# Patient Record
Sex: MALE | Race: ASIAN | NOT HISPANIC OR LATINO | Employment: FULL TIME | URBAN - METROPOLITAN AREA
[De-identification: names, ages, dates, MRNs, and addresses within clinical notes are randomized per-mention and may not be internally consistent; named-entity substitution may affect disease eponyms.]

---

## 2017-03-06 ENCOUNTER — OFFICE VISIT (OUTPATIENT)
Dept: URGENT CARE | Facility: CLINIC | Age: 22
End: 2017-03-06
Payer: COMMERCIAL

## 2017-03-06 ENCOUNTER — APPOINTMENT (OUTPATIENT)
Dept: RADIOLOGY | Facility: IMAGING CENTER | Age: 22
End: 2017-03-06
Attending: NURSE PRACTITIONER
Payer: COMMERCIAL

## 2017-03-06 VITALS
DIASTOLIC BLOOD PRESSURE: 100 MMHG | WEIGHT: 218 LBS | BODY MASS INDEX: 27.98 KG/M2 | HEART RATE: 83 BPM | RESPIRATION RATE: 18 BRPM | SYSTOLIC BLOOD PRESSURE: 150 MMHG | TEMPERATURE: 98.6 F | OXYGEN SATURATION: 95 % | HEIGHT: 74 IN

## 2017-03-06 DIAGNOSIS — S99.922A INJURY OF LEFT FOOT, INITIAL ENCOUNTER: ICD-10-CM

## 2017-03-06 DIAGNOSIS — S96.912A STRAIN OF LEFT ANKLE, INITIAL ENCOUNTER: ICD-10-CM

## 2017-03-06 PROCEDURE — 99214 OFFICE O/P EST MOD 30 MIN: CPT | Performed by: NURSE PRACTITIONER

## 2017-03-06 PROCEDURE — 73610 X-RAY EXAM OF ANKLE: CPT | Mod: TC,LT | Performed by: NURSE PRACTITIONER

## 2017-03-06 ASSESSMENT — ENCOUNTER SYMPTOMS
BRUISES/BLEEDS EASILY: 0
WEAKNESS: 0
FEVER: 0
CHILLS: 0
FALLS: 1
MYALGIAS: 1
SENSORY CHANGE: 0
TINGLING: 0

## 2017-03-06 NOTE — Clinical Note
March 6, 2017       Patient: Leno Jo   YOB: 1995   Date of Visit: 3/6/2017         To Whom It May Concern:    It is my medical opinion that Leno Jo be excused from work due to injury of left foot. May return after 3/10/17.    If you have any questions or concerns, please don't hesitate to call [phone]          Sincerely,          TIRSO [email or web address].  Electronically Signed

## 2017-03-06 NOTE — MR AVS SNAPSHOT
"        Leno Jo   3/6/2017 5:15 PM   Office Visit   MRN: [id number]    Department:  [facility]   Dept Phone:  [phone]    Description:  Male : 1995   Provider:  SHANNAN Youssef           Reason for Visit     Ankle Injury x today was playing basketball and stepped on someone's foot and rolled his L ankle, having pain and swelling       Allergies as of 3/6/2017     Not on File      You were diagnosed with     Injury of left foot, initial encounter   [264177]       Strain of left ankle, initial encounter   [[id number]]         Vital Signs     Blood Pressure Pulse Temperature Respirations Height Weight    150/100 mmHg 83 37 °C (98.6 °F) 18 1.88 m (6' 2\") 98.884 kg (218 lb)    Body Mass Index Oxygen Saturation                27.98 kg/m2 95%          Basic Information     Date Of Birth Sex Race Ethnicity Preferred Language    1995 Male  Non- English      Health Maintenance        Date Due Completion Dates    IMM HEP B VACCINE (1 of 3 - Primary Series) 1995 ---    IMM HEP A VACCINE (1 of 2 - Standard Series) 3/31/1994 ---    IMM HPV VACCINE (1 of 3 - Male 3 Dose Series) 3/31/2004 ---    IMM VARICELLA (CHICKENPOX) VACCINE (1 of 2 - 2 Dose Adolescent Series) 3/31/2006 ---    IMM DTaP/Tdap/Td Vaccine (1 - Tdap) 3/31/2012 ---    IMM INFLUENZA (1) 2016 ---            Current Immunizations     Tuberculin Skin Test 2012 11:45 AM      Below and/or attached are the medications your provider expects you to take. Review all of your home medications and newly ordered medications with your provider and/or pharmacist. Follow medication instructions as directed by your provider and/or pharmacist. Please keep your medication list with you and share with your provider. Update the information when medications are discontinued, doses are changed, or new medications (including over-the-counter products) are added; and carry medication information at all times in the event of " emergency situations     Allergies:  No Known Allergies          Medications  Valid as of: March 06, 2017 -  6:14 PM    Generic Name Brand Name Tablet Size Instructions for use    Ibuprofen (Suspension) MOTRIN 100 MG/5ML Take 10 mg/kg by mouth every 6 hours as needed.        Prochlorperazine Maleate (Tab) COMPAZINE 5 MG Take 1 Tab by mouth every 6 hours as needed for Nausea/Vomiting (or dizziness).        .                 Medicines prescribed today were sent to:     [facility] [address] [place] [address]), [place] - [address]    [address] () [place] [address]    Phone: [phone] Fax: [phone]    Open 24 Hours?: No      Medication refill instructions:       If your prescription bottle indicates you have medication refills left, it is not necessary to call your provider’s office. Please contact your pharmacy and they will refill your medication.    If your prescription bottle indicates you do not have any refills left, you may request refills at any time through one of the following ways: The online BlastRoots system (except Urgent Care), by calling your provider’s office, or by asking your pharmacy to contact your provider’s office with a refill request. Medication refills are processed only during regular business hours and may not be available until the next business day. Your provider may request additional information or to have a follow-up visit with you prior to refilling your medication.   *Please Note: Medication refills are assigned a new Rx number when refilled electronically. Your pharmacy may indicate that no refills were authorized even though a new prescription for the same medication is available at the pharmacy. Please request the medicine by name with the pharmacy before contacting your provider for a refill.        Your To Do List     Future Labs/Procedures Complete By Expires    DX-ANKLE 3+ VIEWS LEFT  As directed 9/6/2017         BlastRoots Access Code: [email or web address]  Expires:  4/5/2017  6:14 PM    MyPrepAppt  A secure, online tool to manage your health information     [employer] Webflow® is a secure, online tool that connects you to your personalized health information from the privacy of your home -- day or night - making it very easy for you to manage your healthcare. Once the activation process is completed, you can even access your medical information using the Webflow jasbir, which is available for free in the Apple Jasbir store or Google Play store.     Webflow provides the following levels of access (as shown below):   My Chart Features   [facility] Primary [facility] Doctor [facility]  Specialists [facility]  Urgent  Care Non-[facility]  Primary Care  Doctor   Email your healthcare team securely and privately 24/7 X X X    Manage appointments: schedule your next appointment; view details of past/upcoming appointments X      Request prescription refills. X      View recent personal medical records, including lab and immunizations X X X X   View health record, including health history, allergies, medications X X X X   Read reports about your outpatient visits, procedures, consult and ER notes X X X X   See your discharge summary, which is a recap of your hospital and/or ER visit that includes your diagnosis, lab results, and care plan. X X       How to register for Webflow:  1. Go to  [email or web address].  2. Click on the Sign Up Now box, which takes you to the New Member Sign Up page. You will need to provide the following information:  a. Enter your Webflow Access Code exactly as it appears at the top of this page. (You will not need to use this code after you’ve completed the sign-up process. If you do not sign up before the expiration date, you must request a new code.)   b. Enter your date of birth.   c. Enter your home email address.   d. Click Submit, and follow the next screen’s instructions.  3. Create a Webflow ID. This will be your Webflow login ID and cannot be changed, so think of one  that is secure and easy to remember.  4. Create a [employer] password. You can change your password at any time.  5. Enter your Password Reset Question and Answer. This can be used at a later time if you forget your password.   6. Enter your e-mail address. This allows you to receive e-mail notifications when new information is available in [employer].  7. Click Sign Up. You can now view your health information.    For assistance activating your [employer] account, call [phone]

## 2017-03-07 NOTE — PROGRESS NOTES
"Subjective:      Leno Jo is a 21 y.o. male who presents with Ankle Injury            Ankle Injury   Pertinent negatives include no tingling.   Leno is a 21 year old male who is here for left ankle injury today. Was playing basketball about 5 hrs ago and fell on top of another person's foot and twisted his left ankle. Swelling and pain with weight bearing and walking. Applied ice x 1 hr today, no NSAID. No previous ankle injury. No ace wrap or topical analgesic. 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    PMH:  has no past medical history on file.  MEDS:   Current outpatient prescriptions:   •  ibuprofen (MOTRIN) 100 MG/5ML Suspension, Take 10 mg/kg by mouth every 6 hours as needed., Disp: , Rfl:   •  prochlorperazine (COMPAZINE) 5 MG Tab, Take 1 Tab by mouth every 6 hours as needed for Nausea/Vomiting (or dizziness)., Disp: 15 Tab, Rfl: 0  ALLERGIES: Not on File  SURGHX: No past surgical history on file.  SOCHX:    FH: Family history was reviewed, no pertinent findings to report      Review of Systems   Constitutional: Negative for fever, chills and malaise/fatigue.   Musculoskeletal: Positive for myalgias, joint pain and falls.   Neurological: Negative for tingling, sensory change and weakness.   Endo/Heme/Allergies: Does not bruise/bleed easily.          Objective:     /100 mmHg  Pulse 83  Temp(Src) 37 °C (98.6 °F)  Resp 18  Ht 1.88 m (6' 2\")  Wt 98.884 kg (218 lb)  BMI 27.98 kg/m2  SpO2 95%     Physical Exam   Constitutional: He is oriented to person, place, and time. He appears well-developed and well-nourished. No distress.   HENT:   Head: Normocephalic.   Eyes: EOM are normal.   Neck: Neck supple.   Cardiovascular:   Pulses:       Dorsalis pedis pulses are 2+ on the left side.        Posterior tibial pulses are 2+ on the left side.   Pulmonary/Chest: Effort normal.   Musculoskeletal:        Left foot: There is decreased range of motion, tenderness, " bony tenderness and swelling. There is normal capillary refill, no crepitus and no deformity.        Feet:    Swelling 2+ on lateral aspect of left ankle bone, swelling 1+ on medial aspect, TTP at both site, no redness or bruising seen   Feet:   Left Foot:   Protective Sensation: 10 sites tested. 10 sites sensed.  Skin Integrity: Negative for ulcer, blister, skin breakdown, erythema, warmth, callus or dry skin.   Neurological: He is alert and oriented to person, place, and time.   Skin: Skin is warm and dry. He is not diaphoretic. No erythema.   Vitals reviewed.    Left foot xray:    FINDINGS:  The alignment of the ankle is normal.  There is no  soft tissue swelling.  There is no evidence of displaced fracture or dislocation.     MA applied ace wrap and crutches   Assessment/Plan:     1. Injury of left foot, initial encounter    - DX-ANKLE 3+ VIEWS LEFT; Future    2. Strain of left ankle, initial encounter    May use Ibuprofen prn for pain, up to 600 mg every 4-6 hrs  May use cool compresses for swelling prn  May utilize RICE method prn  Avoid excessive weight bearing until swelling and pain improve  May apply topical analgesics prn  Perform proper body mechanics with lifting, twisting, bending and walking. Need assistance with heavy lifting   Monitor for deformity, numbness/tingling in toes, decreased ROM with walking difficulty, increase swelling/pain- need re-evaluation  Work note provided

## 2017-03-09 ENCOUNTER — TELEPHONE (OUTPATIENT)
Dept: URGENT CARE | Facility: CLINIC | Age: 22
End: 2017-03-09

## 2017-03-10 ENCOUNTER — OFFICE VISIT (OUTPATIENT)
Dept: MEDICAL GROUP | Facility: CLINIC | Age: 22
End: 2017-03-10
Payer: COMMERCIAL

## 2017-03-10 VITALS
TEMPERATURE: 98.2 F | HEART RATE: 67 BPM | RESPIRATION RATE: 18 BRPM | WEIGHT: 217 LBS | DIASTOLIC BLOOD PRESSURE: 90 MMHG | OXYGEN SATURATION: 98 % | SYSTOLIC BLOOD PRESSURE: 140 MMHG | BODY MASS INDEX: 27.85 KG/M2 | HEIGHT: 74 IN

## 2017-03-10 DIAGNOSIS — M25.572 ACUTE LEFT ANKLE PAIN: ICD-10-CM

## 2017-03-10 PROCEDURE — 99213 OFFICE O/P EST LOW 20 MIN: CPT | Performed by: PHYSICIAN ASSISTANT

## 2017-03-10 RX ORDER — DICLOFENAC POTASSIUM 50 MG/1
50 TABLET, FILM COATED ORAL 3 TIMES DAILY
Qty: 30 TAB | Refills: 0 | Status: SHIPPED | OUTPATIENT
Start: 2017-03-10 | End: 2018-02-09

## 2017-03-10 NOTE — PROGRESS NOTES
Chief Complaint   Patient presents with   • Ankle Injury     x4 days       HPI  Leno Jo is a 21 y.o. male here today for L ankle pain following injury in basketball. This is a new problem to me. Pain has subsided, currently 5/10. Has tried ibuprofen and ice. States pain has subsided and he can walk without any help, does not use crutches.  Patient can bear weight over left feet and states he is able to lift objects.   pt was seen at  and Xray revealed no acute fractures or dislocations. The patient was off work. He works at [employer] with lifting heavy objects. Patient states he is ready to go back to work    Past medical history is reviewed in Epic chart by me today.   Medications and allergies reviewed and updated in Epic chart by me today.     ROS:   As documented in history of present illness above    Exam:  There were no vitals taken for this visit.  Constitutional: Alert, no distress, plus 3 vital signs  Skin:  Warm, dry, no rashes invisible areas  MS: L ankel and dorsal feet swollen compared to R feet, pulse intact.  mild pain w passive plantar flexion and inversion of the foot, walks without limbing  CV: RRR  Psych: Alert, pleasant, well-groomed, normal affect      A/P:  1. Acute left ankle pain  Discussed Ace bandage, rest, ice, anti-inflammatory  - diclofenac (CATAFLAM) 50 MG tablet; Take 1 Tab by mouth 3 times a day.  Dispense: 30 Tab; Refill: 0  Paperwork was filled for patient to return to work on March 14    F/u prn

## 2017-03-10 NOTE — MR AVS SNAPSHOT
"Leno Jo   3/10/2017 10:25 AM   Office Visit   MRN: [id number]    Department:  [facility]   Dept Phone:  [phone]    Description:  Male : 1995   Provider:  Divya Gonzalez PA-C           Reason for Visit     Ankle Injury x4 days      Allergies as of 3/10/2017     No Known Allergies      You were diagnosed with     Acute left ankle pain   [[id number]]         Vital Signs     Blood Pressure Pulse Temperature Respirations Height Weight    140/90 mmHg 67 36.8 °C (98.2 °F) 18 1.88 m (6' 2.02\") 98.431 kg (217 lb)    Body Mass Index Oxygen Saturation                27.85 kg/m2 98%          Basic Information     Date Of Birth Sex Race Ethnicity Preferred Language    1995 Male  Non- English      Health Maintenance        Date Due Completion Dates    IMM HEP B VACCINE (1 of 3 - Primary Series) 1995 ---    IMM HEP A VACCINE (1 of 2 - Standard Series) 3/31/1994 ---    IMM HPV VACCINE (1 of 3 - Male 3 Dose Series) 3/31/2004 ---    IMM VARICELLA (CHICKENPOX) VACCINE (1 of 2 - 2 Dose Adolescent Series) 3/31/2006 ---    IMM DTaP/Tdap/Td Vaccine (1 - Tdap) 3/31/2012 ---    IMM INFLUENZA (1) 2016 ---            Current Immunizations     Tuberculin Skin Test 2012 11:45 AM      Below and/or attached are the medications your provider expects you to take. Review all of your home medications and newly ordered medications with your provider and/or pharmacist. Follow medication instructions as directed by your provider and/or pharmacist. Please keep your medication list with you and share with your provider. Update the information when medications are discontinued, doses are changed, or new medications (including over-the-counter products) are added; and carry medication information at all times in the event of emergency situations     Allergies:  No Known Allergies          Medications  Valid as of: March 10, 2017 -  1:28 PM    Generic Name Brand Name Tablet Size " Instructions for use    Diclofenac Potassium (Tab) CATAFLAM 50 MG Take 1 Tab by mouth 3 times a day.        Ibuprofen (Suspension) MOTRIN 100 MG/5ML Take 10 mg/kg by mouth every 6 hours as needed.        Prochlorperazine Maleate (Tab) COMPAZINE 5 MG Take 1 Tab by mouth every 6 hours as needed for Nausea/Vomiting (or dizziness).        .                 Medicines prescribed today were sent to:     [address] (), [place] - [address]    [address] () [place] [address]    Phone: [phone] Fax: [phone]    Open 24 Hours?: No      Medication refill instructions:       If your prescription bottle indicates you have medication refills left, it is not necessary to call your provider’s office. Please contact your pharmacy and they will refill your medication.    If your prescription bottle indicates you do not have any refills left, you may request refills at any time through one of the following ways: The online MeMed system (except Urgent Care), by calling your provider’s office, or by asking your pharmacy to contact your provider’s office with a refill request. Medication refills are processed only during regular business hours and may not be available until the next business day. Your provider may request additional information or to have a follow-up visit with you prior to refilling your medication.   *Please Note: Medication refills are assigned a new Rx number when refilled electronically. Your pharmacy may indicate that no refills were authorized even though a new prescription for the same medication is available at the pharmacy. Please request the medicine by name with the pharmacy before contacting your provider for a refill.           MeMed Access Code: [email or web address]  Expires: 4/5/2017  6:14 PM    MeMed  A secure, online tool to manage your health information     [employer]’s MeMed® is a secure, online tool that connects you to your [employer] health  information from the privacy of your home -- day or night - making it very easy for you to manage your healthcare. Once the activation process is completed, you can even access your medical information using the Pley jasbir, which is available for free in the Apple Jasbir store or Google Play store.     Pley provides the following levels of access (as shown below):   My Chart Features   [facility] Primary Care Doctor [facility]  Specialists [facility]  Urgent  Care Non-[facility]  Primary Care  Doctor   Email your healthcare team securely and privately 24/7 X X X    Manage appointments: schedule your next appointment; view details of past/upcoming appointments X      Request prescription refills. X      View recent personal medical records, including lab and immunizations X X X X   View health record, including health history, allergies, medications X X X X   Read reports about your outpatient visits, procedures, consult and ER notes X X X X   See your discharge summary, which is a recap of your hospital and/or ER visit that includes your diagnosis, lab results, and care plan. X X       How to register for Pley:  1. Go to  [email or web address].  2. Click on the Sign Up Now box, which takes you to the New Member Sign Up page. You will need to provide the following information:  a. Enter your Pley Access Code exactly as it appears at the top of this page. (You will not need to use this code after you’ve completed the sign-up process. If you do not sign up before the expiration date, you must request a new code.)   b. Enter your date of birth.   c. Enter your home email address.   d. Click Submit, and follow the next screen’s instructions.  3. Create a Pley ID. This will be your Pley login ID and cannot be changed, so think of one that is secure and easy to remember.  4. Create a Pley password. You can change your password at any time.  5. Enter your Password Reset Question and Answer. This can be used at a later  time if you forget your password.   6. Enter your e-mail address. This allows you to receive e-mail notifications when new information is available in [employer].  7. Click Sign Up. You can now view your health information.    For assistance activating your [employer] account, call [phone]

## 2017-03-10 NOTE — TELEPHONE ENCOUNTER
You saw pt 3/6 and pt is requesting to see you for pts leave of abscence claim due to injury seen for. Pt needs release to go back to work. I tried to advise him towards a primary care dr and he feels as though you need to be the one to fill out paper work.

## 2018-02-09 ENCOUNTER — OFFICE VISIT (OUTPATIENT)
Dept: URGENT CARE | Facility: CLINIC | Age: 23
End: 2018-02-09
Payer: COMMERCIAL

## 2018-02-09 VITALS
SYSTOLIC BLOOD PRESSURE: 156 MMHG | OXYGEN SATURATION: 96 % | RESPIRATION RATE: 18 BRPM | HEIGHT: 74 IN | BODY MASS INDEX: 31.83 KG/M2 | HEART RATE: 80 BPM | TEMPERATURE: 98.2 F | DIASTOLIC BLOOD PRESSURE: 96 MMHG | WEIGHT: 248 LBS

## 2018-02-09 DIAGNOSIS — R03.0 ELEVATED BLOOD PRESSURE READING: ICD-10-CM

## 2018-02-09 PROCEDURE — 99213 OFFICE O/P EST LOW 20 MIN: CPT | Performed by: NURSE PRACTITIONER

## 2018-02-10 NOTE — PROGRESS NOTES
Chief Complaint   Patient presents with   • Blood Pressure Problem     today blood pressure was high at dentist office  167/110       HISTORY OF PRESENT ILLNESS: Patient is a 22 y.o. male who presents to urgent care today with complaints of elevated blood pressure. The patient reports he went to his dentist today in order to schedule a tooth extraction. He was found to have an elevated blood pressure reading of 167/110. He denies a history of known hypertension. He denies associated chest pain, shortness breath, dizziness, syncope, unilateral weakness, swelling, changes in urination, or fatigue. States he otherwise feels well.     There are no active problems to display for this patient.      Allergies:Patient has no known allergies.    No current [facility]-ordered outpatient prescriptions on file.     No current [facility]-ordered facility-administered medications on file.        No past medical history on file.    Social History   Substance Use Topics   • Smoking status: Not on file   • Smokeless tobacco: Not on file   • Alcohol use Not on file       No family status information on file.   No family history on file.    ROS:  Review of Systems   Constitutional: Negative for fever, chills, weight loss, malaise, and fatigue.   HENT: Negative for ear pain, nosebleeds, congestion, sore throat and neck pain.    Eyes: Negative for vision changes.   Neuro: Negative for headache, sensory changes, weakness, seizure, LOC.   Cardiovascular: Negative for chest pain, palpitations, orthopnea and leg swelling.   Respiratory: Negative for cough, sputum production, shortness of breath and wheezing.   Gastrointestinal: Negative for abdominal pain, nausea, vomiting or diarrhea.   Genitourinary: Negative for dysuria, urgency and frequency.  Musculoskeletal: Negative for falls, neck pain, back pain, joint pain, myalgias.   Skin: Negative for rash, diaphoresis.     Exam:  Blood pressure 156/96, pulse 80, temperature 36.8 °C (98.2 °F), resp. rate  "18, height 1.88 m (6' 2\"), weight 112.5 kg (248 lb), SpO2 96 %.  General: well-nourished, well-developed male in NAD  Head: normocephalic, atraumatic  Eyes: PERRLA, no conjunctival injection, acuity grossly intact, lids normal.  Ears: normal shape and symmetry, no tenderness, no discharge. External canals are without any significant edema or erythema. Tympanic membranes are without any inflammation, no effusion. Gross auditory acuity is intact.  Nose: symmetrical without tenderness, no discharge.  Mouth/Throat: reasonable hygiene, no erythema, exudates or tonsillar enlargement.  Neck: no masses, range of motion within normal limits, no tracheal deviation. No obvious thyroid enlargement.   Lymph: no cervical adenopathy. No supraclavicular adenopathy.   Neuro: alert and oriented. Cranial nerves 1-12 grossly intact. No sensory deficit.   Cardiovascular: regular rate and rhythm. No edema.  Pulmonary: no distress. Chest is symmetrical with respiration, no wheezes, crackles, or rhonchi.   Musculoskeletal: no clubbing, appropriate muscle tone, gait is stable.  Skin: warm, dry, intact, no clubbing, no cyanosis, no rashes.   Psych: appropriate mood, affect, judgement.         Assessment/Plan:  1. Elevated blood pressure reading         The patient's blood pressure reading is elevated in office today at 160/98. The patient's blood pressure decreased to 156/96 over the next 20 minutes. He schedule an appointment to establish care and for blood pressure medication management with a PCP next week. The meantime I have encouraged a reduced salt diet and daily exercise. He is instructed to check and log blood pressures regularly.  Supportive care, differential diagnoses, and indications for immediate follow-up discussed with patient.   Pathogenesis of diagnosis discussed including typical length and natural progression.   Instructed to return to clinic or nearest emergency department for any change in condition, further concerns, " or worsening of symptoms.  Patient states understanding of the plan of care and discharge instructions.  Instructed to follow up with his new PCP next week as planned.        Please note that this dictation was created using voice recognition software. I have made every reasonable attempt to correct obvious errors, but I expect that there are errors of grammar and possibly content that I did not discover before finalizing the note.      COLE Ervin.

## 2018-03-02 ENCOUNTER — OFFICE VISIT (OUTPATIENT)
Dept: MEDICAL GROUP | Facility: PHYSICIAN GROUP | Age: 23
End: 2018-03-02
Payer: COMMERCIAL

## 2018-03-02 VITALS
SYSTOLIC BLOOD PRESSURE: 132 MMHG | TEMPERATURE: 97.9 F | OXYGEN SATURATION: 97 % | BODY MASS INDEX: 31.7 KG/M2 | HEART RATE: 67 BPM | HEIGHT: 74 IN | DIASTOLIC BLOOD PRESSURE: 70 MMHG | RESPIRATION RATE: 16 BRPM | WEIGHT: 247 LBS

## 2018-03-02 DIAGNOSIS — I10 ESSENTIAL HYPERTENSION: ICD-10-CM

## 2018-03-02 DIAGNOSIS — E66.9 OBESITY (BMI 30-39.9): ICD-10-CM

## 2018-03-02 DIAGNOSIS — Z83.3 FAMILY HISTORY OF DIABETES MELLITUS IN FATHER: ICD-10-CM

## 2018-03-02 PROCEDURE — 99214 OFFICE O/P EST MOD 30 MIN: CPT | Performed by: NURSE PRACTITIONER

## 2018-03-02 RX ORDER — LISINOPRIL 10 MG/1
10 TABLET ORAL DAILY
Qty: 30 TAB | Refills: 0 | Status: SHIPPED | OUTPATIENT
Start: 2018-03-02 | End: 2019-04-30

## 2018-03-02 ASSESSMENT — PAIN SCALES - GENERAL: PAINLEVEL: NO PAIN

## 2018-03-02 ASSESSMENT — PATIENT HEALTH QUESTIONNAIRE - PHQ9: CLINICAL INTERPRETATION OF PHQ2 SCORE: 0

## 2018-03-03 NOTE — ASSESSMENT & PLAN NOTE
This is a new problem. States that over the last year he has had multiple elevated BP. States it was severely elevated at the dentist and he was seen at urgent care with a 156/96, in 2017 he had a blood pressure 140/90. Drinks energy drinks 3x/week and coffee 4x/week. Eats a lot of candies and fried processed foods. They do cook at home which they eat lots of fish and meats, patient does not like vegetable.

## 2018-03-03 NOTE — PROGRESS NOTES
Chief Complaint   Patient presents with   • Establish Care     New Patient    • Blood Pressure Problem     x 2 weeks 160/90 home cuff    • Other     wants blood work        HISTORY OF PRESENT ILLNESS: Patient is a 22 y.o. male established patient who presents today to discuss blood pressure.    Essential hypertension  This is a new problem. States that over the last year he has had multiple elevated BP. States it was severely elevated at the dentist and he was seen at urgent care with a 156/96, in 2017 he had a blood pressure 140/90. Drinks energy drinks 3x/week and coffee 4x/week. Eats a lot of candies and fried processed foods. They do cook at home which they eat lots of fish and meats, patient does not like vegetable.       Patient Active Problem List    Diagnosis Date Noted   • Essential hypertension 03/02/2018   • Obesity (BMI 30-39.9) 03/02/2018       Allergies:Patient has no known allergies.    Current Outpatient Prescriptions   Medication Sig Dispense Refill   • lisinopril (PRINIVIL) 10 MG Tab Take 1 Tab by mouth every day. 30 Tab 0     No current facility-administered medications for this visit.        Social History   Substance Use Topics   • Smoking status: Never Smoker   • Smokeless tobacco: Never Used   • Alcohol use Yes      Comment: occ        Family Status   Relation Status   • Mother Alive   • Father Alive   • Brother      Family History   Problem Relation Age of Onset   • No Known Problems Mother    • Diabetes Father    • Diabetes Brother        Review of Systems:   Constitutional:  Negative for fever, chills, weight loss and malaise/fatigue.   HEENT:  Negative for ear pain, nosebleeds, congestion, sore throat and neck pain.    Eyes:  Negative for blurred vision.   Respiratory:  Negative for cough, sputum production, shortness of breath and wheezing.    Cardiovascular:  Negative for chest pain, palpitations, orthopnea and leg swelling.   Gastrointestinal:  Negative for heartburn, nausea, vomiting  "and abdominal pain.   Genitourinary:  Negative for dysuria, urgency and frequency.   Musculoskeletal:  Negative for myalgias, back pain and joint pain.   Skin:  Negative for rash and itching.   Neurological:  Negative for dizziness, tingling, tremors, sensory change, focal weakness and headaches.   Endo/Heme/Allergies:  Does not bruise/bleed easily.   Psychiatric/Behavioral:  Negative for depression, suicidal ideas and memory loss.  The patient is not nervous/anxious and does not have insomnia.    All other systems reviewed and are negative except as in HPI.    Exam:  Blood pressure 132/70, pulse 67, temperature 36.6 °C (97.9 °F), resp. rate 16, height 1.88 m (6' 2\"), weight 112 kg (247 lb), SpO2 97 %.  General:  Normal appearing. No distress.  HEENT:  Normocephalic. Eyes conjunctiva clear lids without ptosis, pupils equal and reactive to light accommodation, ears normal shape and contour, canals are clear bilaterally, tympanic membranes are benign, nasal mucosa benign, oropharynx is without erythema, edema or exudates.   Neck:  Supple without JVD or bruit. Thyroid is not enlarged.  Pulmonary:  Clear to ausculation.  Normal effort. No rales, ronchi, or wheezing.  Cardiovascular:  Regular rate and rhythm without murmur. Carotid and radial pulses are intact and equal bilaterally.  Abdomen:  Soft, nontender, nondistended. Normal bowel sounds. Liver and spleen are not palpable  Neurologic:  Grossly nonfocal  Lymph:  No cervical, supraclavicular or axillary lymph nodes are palpable  Skin:  Warm and dry.  No obvious lesions.  Musculoskeletal:  Normal gait. No extremity cyanosis, clubbing, or edema.  Psych:  Normal mood and affect. Alert and oriented x3. Judgment and insight is normal.      PLAN:    1. Essential hypertension  Patient has had multiple elevated blood pressures including a 140/90 in 2017, 156/96 in February, 160 something over 110  Plan to start lisinopril 10 mg daily. Counseled patient guarding risk, " benefits, side effects discussed with patient that if he has side effects of the medication he should contact this provider immediately as we can change his medication prior to his next appointment if necessary.  - LIPID PROFILE; Future  - COMP METABOLIC PANEL; Future  - lisinopril (PRINIVIL) 10 MG Tab; Take 1 Tab by mouth every day.  Dispense: 30 Tab; Refill: 0    2. Family history of diabetes mellitus in father  Plan to check patient's fasting sugar related to obesity, family history of type 2 diabetes in his father which developed at a young age.    3. Obesity (BMI 30-39.9)  Counseled patient extensively regarding healthy diet and exercise, discussed decreasing his sugar intake discussed the importance of eating a variety of foods, discussed the importance of eating vegetables discussed taking daily multivitamin, discussed not drinking energy drinks as they can cause elevated blood pressure.    Follow-up in one month or sooner as needed. Patient is encouraged to be seen in the emergency room for chest pain, palpitations, shortness of breath, dizziness, severe abdominal pain or other concerning symptoms.      Please note that this dictation was created using voice recognition software. I have made every reasonable attempt to correct obvious errors, but I expect that there are errors of grammar and possibly content that I did not discover before finalizing the note.    Assessment/Plan:  1. Essential hypertension  LIPID PROFILE    COMP METABOLIC PANEL    lisinopril (PRINIVIL) 10 MG Tab   2. Family history of diabetes mellitus in father     3. Obesity (BMI 30-39.9)            I have placed the below orders and discussed them with an approved delegating provider. The MA is performing the below orders under the direction of Dr. Manzano.

## 2019-04-30 ENCOUNTER — OFFICE VISIT (OUTPATIENT)
Dept: MEDICAL GROUP | Facility: PHYSICIAN GROUP | Age: 24
End: 2019-04-30
Payer: COMMERCIAL

## 2019-04-30 VITALS
DIASTOLIC BLOOD PRESSURE: 98 MMHG | RESPIRATION RATE: 16 BRPM | BODY MASS INDEX: 33.26 KG/M2 | WEIGHT: 259.2 LBS | HEART RATE: 95 BPM | TEMPERATURE: 98.7 F | HEIGHT: 74 IN | SYSTOLIC BLOOD PRESSURE: 178 MMHG | OXYGEN SATURATION: 97 %

## 2019-04-30 DIAGNOSIS — I10 ESSENTIAL HYPERTENSION: ICD-10-CM

## 2019-04-30 DIAGNOSIS — Z00.00 ROUTINE MEDICAL EXAM: ICD-10-CM

## 2019-04-30 DIAGNOSIS — Z83.3 FAMILY HISTORY OF DIABETES MELLITUS IN FIRST DEGREE RELATIVE: ICD-10-CM

## 2019-04-30 DIAGNOSIS — R53.83 FATIGUE, UNSPECIFIED TYPE: ICD-10-CM

## 2019-04-30 DIAGNOSIS — E66.9 OBESITY (BMI 30-39.9): ICD-10-CM

## 2019-04-30 PROCEDURE — 99395 PREV VISIT EST AGE 18-39: CPT | Performed by: NURSE PRACTITIONER

## 2019-04-30 RX ORDER — LISINOPRIL 20 MG/1
20 TABLET ORAL DAILY
Qty: 30 TAB | Refills: 0 | Status: SHIPPED | OUTPATIENT
Start: 2019-04-30 | End: 2019-06-03 | Stop reason: SDUPTHER

## 2019-04-30 ASSESSMENT — PATIENT HEALTH QUESTIONNAIRE - PHQ9: CLINICAL INTERPRETATION OF PHQ2 SCORE: 0

## 2019-04-30 NOTE — PROGRESS NOTES
Subjective:     CC:   Chief Complaint   Patient presents with   • Labs Only       HPI:   Leno Jo is a 24 y.o. male who presents for an annual exam.     Essential hypertension  Chronic. Patient was put on lisinopril in the past for a period of time but stopped taking it. He denies having any side effects from this medication. States he just forgot to take the medication. His blood pressure in clinic today was 178/98. Patient states he has intermittent shortness of breath that occurs when he exercises. He reports occasional chest pain. Wife states he has had recent weight gain. No reports of palpitations, dizziness, headaches, abdominal pain, throat swelling, swollen nodes or leg swelling. He mentions that he has not been exercising as much lately due to currently being on paternity leave and having shortness of breath with exertion. He denies history of asthma.    Family history of diabetes mellitus in first degree relative  Patient does not know if he has ever had elevated blood sugar in the past. However, he does report that his father and brother have history of diabetes.    Routine medical exam  Patient would like to get routine labs ordered. Patient mentions that his posterior neck is sometimes sore. It is not too bothersome to need intervention at this time. Also reports intermittent back tingling that occurs when standing for long periods of time but will further discuss at future visit.    Last colonoscopy: Not of age  Last Tdap: 4/5/19   Hx STDs: No. In same relationship  Recent STD test: No, in same relationship  Regular exercise: Decreased due to having shortness of breath with exertion  Diet: Okay, had recent weight gain    He  has a past medical history of Hypertension.  He  has no past surgical history on file.  Family History   Problem Relation Age of Onset   • No Known Problems Mother    • Diabetes Father    • Diabetes Brother      Social History   Substance Use Topics   • Smoking status:  "Never Smoker   • Smokeless tobacco: Never Used   • Alcohol use Yes      Comment: occ        Patient Active Problem List    Diagnosis Date Noted   • Essential hypertension 03/02/2018   • Obesity (BMI 30-39.9) 03/02/2018       Current Outpatient Prescriptions   Medication Sig Dispense Refill   • lisinopril (PRINIVIL) 20 MG Tab Take 1 Tab by mouth every day. 30 Tab 0     No current facility-administered medications for this visit.     (including changes today)  Allergies: Patient has no known allergies.    Review of Systems   Constitutional: Recent weight gain. Negative for fever, chills and malaise/fatigue.   HENT: Neck soreness. Negative for congestion, throat swelling  Eyes: Right eye twitching. Negative for pain.   Respiratory: Shortness of breath (with exertion). Negative for cough.  Cardiovascular: Occasional chest pain. Negative for leg swelling, palpitations.   Gastrointestinal: Negative for nausea, vomiting, abdominal pain and diarrhea.   Genitourinary: Negative for dysuria and hematuria.   Skin: Negative for rash.   Lymphatic: Negative for swollen lymph nodes.  Neurological: Intermittent back tingling. Negative for dizziness, focal weakness and headaches.   Endo/Heme/Allergies: Does not bruise/bleed easily.   Psychiatric/Behavioral: Negative for depression.  The patient is not nervous/anxious.    As above, all other systems negative.     Objective:     BP (!) 178/98   Pulse 95   Temp 37.1 °C (98.7 °F)   Resp 16   Ht 1.88 m (6' 2\")   Wt 117.6 kg (259 lb 3.2 oz)   SpO2 97%   BMI 33.28 kg/m²   Body mass index is 33.28 kg/m².  Wt Readings from Last 4 Encounters:   04/30/19 117.6 kg (259 lb 3.2 oz)   03/02/18 112 kg (247 lb)   02/09/18 112.5 kg (248 lb)   03/10/17 98.4 kg (217 lb)       Physical Exam:  Constitutional: Well-developed and well-nourished. Not diaphoretic. No distress.   Skin: Skin is warm and dry. No rash noted.  Head: Atraumatic without lesions.  Eyes: Conjunctivae and extraocular motions are " normal. Pupils are equal, round, and reactive to light. No scleral icterus.   Ears:  External ears unremarkable. Tympanic membranes clear and intact.  Nose: Nares patent. Septum midline. Turbinates without erythema nor edema. No discharge.   Mouth/Throat: Dentition is normal. Tongue normal. Oropharynx is clear and moist. Posterior pharynx without erythema or exudates.  Neck: Supple, trachea midline. Normal range of motion. No thyromegaly present. No lymphadenopathy--cervical or supraclavicular.  Cardiovascular: Regular rate and rhythm, S1 and S2 without murmur, rubs, or gallops.    Chest: Effort normal. Clear to auscultation throughout. No adventitious sounds. No CVA tenderness.  Abdomen: Soft, non tender, and without distention. Active bowel sounds in all four quadrants. No rebound, guarding, masses or HSM.  Extremities: No cyanosis, clubbing, erythema, nor edema. Distal pulses intact and symmetric.   Musculoskeletal: All major joints AROM full in all directions without pain.  Neurological: Alert and oriented x 3. DTRs 2+/3 and symmetric. No cranial nerve deficit. Sensation intact. Negative Rhomberg.  Psychiatric:  Behavior, mood, and affect are appropriate.    Assessment and Plan:     1. Essential hypertension  - Restarting patient on lisinopril Reviewed the risks and benefits as well as potential side effects of lisinopril with patient.   - Counseled patient on diet and exercise. Advised low sodium diet.   - lisinopril (PRINIVIL) 20 MG Tab; Take 1 Tab by mouth every day.  Dispense: 30 Tab; Refill: 0    2. Family history of diabetes mellitus in first degree relative  - Ordering A1C  - HEMOGLOBIN A1C; Future    3. Routine medical exam  - Ordering routine labs  - CBC WITH DIFFERENTIAL; Future  - Comp Metabolic Panel; Future  - Lipid Profile; Future  - HEMOGLOBIN A1C; Future    4. Obesity (BMI 30-39.9)  - Patient identified as having weight management issue.  Appropriate orders and counseling given.    5. Fatigue,  unspecified type  - Ordering TSH  - TSH; Future     Plan:  Labs per orders.  Vaccinations per orders.  Counseling about diet, supplements, exercise, skin care and safe sex.    Patient is encouraged to be seen in the emergency room for chest pain, palpitations, shortness of breath, dizziness, severe abdominal pain or other concerning symptoms.     Follow-up: Return in about 1 month (around 5/30/2019), or if symptoms worsen or fail to improve.     IJosiah (Scribe), am scribing for, and in the presence of, TRUONG Leslie    Electronically signed by: Josiah Billings (Wendyibe), 4/30/2019    Bharat VASQUEZ APRN personally performed the services described in this documentation, as scribed by Josiah Billings in my presence, and it is both accurate and complete.

## 2019-05-13 ENCOUNTER — HOSPITAL ENCOUNTER (OUTPATIENT)
Dept: LAB | Facility: MEDICAL CENTER | Age: 24
End: 2019-05-13
Attending: NURSE PRACTITIONER
Payer: COMMERCIAL

## 2019-05-13 DIAGNOSIS — Z83.3 FAMILY HISTORY OF DIABETES MELLITUS IN FIRST DEGREE RELATIVE: ICD-10-CM

## 2019-05-13 DIAGNOSIS — Z00.00 ROUTINE MEDICAL EXAM: ICD-10-CM

## 2019-05-13 DIAGNOSIS — R53.83 FATIGUE, UNSPECIFIED TYPE: ICD-10-CM

## 2019-05-13 LAB
ALBUMIN SERPL BCP-MCNC: 4.3 G/DL (ref 3.2–4.9)
ALBUMIN/GLOB SERPL: 1.3 G/DL
ALP SERPL-CCNC: 54 U/L (ref 30–99)
ALT SERPL-CCNC: 52 U/L (ref 2–50)
ANION GAP SERPL CALC-SCNC: 10 MMOL/L (ref 0–11.9)
AST SERPL-CCNC: 23 U/L (ref 12–45)
BASOPHILS # BLD AUTO: 1.1 % (ref 0–1.8)
BASOPHILS # BLD: 0.07 K/UL (ref 0–0.12)
BILIRUB SERPL-MCNC: 0.6 MG/DL (ref 0.1–1.5)
BUN SERPL-MCNC: 11 MG/DL (ref 8–22)
CALCIUM SERPL-MCNC: 9.6 MG/DL (ref 8.5–10.5)
CHLORIDE SERPL-SCNC: 104 MMOL/L (ref 96–112)
CHOLEST SERPL-MCNC: 228 MG/DL (ref 100–199)
CO2 SERPL-SCNC: 26 MMOL/L (ref 20–33)
CREAT SERPL-MCNC: 0.74 MG/DL (ref 0.5–1.4)
EOSINOPHIL # BLD AUTO: 0.11 K/UL (ref 0–0.51)
EOSINOPHIL NFR BLD: 1.7 % (ref 0–6.9)
ERYTHROCYTE [DISTWIDTH] IN BLOOD BY AUTOMATED COUNT: 37.3 FL (ref 35.9–50)
EST. AVERAGE GLUCOSE BLD GHB EST-MCNC: 143 MG/DL
FASTING STATUS PATIENT QL REPORTED: NORMAL
GLOBULIN SER CALC-MCNC: 3.4 G/DL (ref 1.9–3.5)
GLUCOSE SERPL-MCNC: 99 MG/DL (ref 65–99)
HBA1C MFR BLD: 6.6 % (ref 0–5.6)
HCT VFR BLD AUTO: 49 % (ref 42–52)
HDLC SERPL-MCNC: 48 MG/DL
HGB BLD-MCNC: 16.6 G/DL (ref 14–18)
IMM GRANULOCYTES # BLD AUTO: 0.03 K/UL (ref 0–0.11)
IMM GRANULOCYTES NFR BLD AUTO: 0.5 % (ref 0–0.9)
LDLC SERPL CALC-MCNC: 151 MG/DL
LYMPHOCYTES # BLD AUTO: 2.29 K/UL (ref 1–4.8)
LYMPHOCYTES NFR BLD: 34.4 % (ref 22–41)
MCH RBC QN AUTO: 29.9 PG (ref 27–33)
MCHC RBC AUTO-ENTMCNC: 33.9 G/DL (ref 33.7–35.3)
MCV RBC AUTO: 88.3 FL (ref 81.4–97.8)
MONOCYTES # BLD AUTO: 0.48 K/UL (ref 0–0.85)
MONOCYTES NFR BLD AUTO: 7.2 % (ref 0–13.4)
NEUTROPHILS # BLD AUTO: 3.68 K/UL (ref 1.82–7.42)
NEUTROPHILS NFR BLD: 55.1 % (ref 44–72)
NRBC # BLD AUTO: 0 K/UL
NRBC BLD-RTO: 0 /100 WBC
PLATELET # BLD AUTO: 263 K/UL (ref 164–446)
PMV BLD AUTO: 11.5 FL (ref 9–12.9)
POTASSIUM SERPL-SCNC: 3.9 MMOL/L (ref 3.6–5.5)
PROT SERPL-MCNC: 7.7 G/DL (ref 6–8.2)
RBC # BLD AUTO: 5.55 M/UL (ref 4.7–6.1)
SODIUM SERPL-SCNC: 140 MMOL/L (ref 135–145)
TRIGL SERPL-MCNC: 146 MG/DL (ref 0–149)
TSH SERPL DL<=0.005 MIU/L-ACNC: 2.03 UIU/ML (ref 0.38–5.33)
WBC # BLD AUTO: 6.7 K/UL (ref 4.8–10.8)

## 2019-05-13 PROCEDURE — 36415 COLL VENOUS BLD VENIPUNCTURE: CPT

## 2019-05-13 PROCEDURE — 84443 ASSAY THYROID STIM HORMONE: CPT

## 2019-05-13 PROCEDURE — 83036 HEMOGLOBIN GLYCOSYLATED A1C: CPT

## 2019-05-13 PROCEDURE — 85025 COMPLETE CBC W/AUTO DIFF WBC: CPT

## 2019-05-13 PROCEDURE — 80061 LIPID PANEL: CPT

## 2019-05-13 PROCEDURE — 80053 COMPREHEN METABOLIC PANEL: CPT

## 2019-05-14 ENCOUNTER — TELEPHONE (OUTPATIENT)
Dept: MEDICAL GROUP | Facility: PHYSICIAN GROUP | Age: 24
End: 2019-05-14

## 2019-05-14 NOTE — TELEPHONE ENCOUNTER
----- Message from BELLO Sousa sent at 5/14/2019  7:27 AM PDT -----  Please call pt and give lab results:  Overall labs are within normal limits.  TSH is 2.030 which is perfect.  There is a very mild elevation of your ALT which is a liver enzyme.  This is likely related to elevated total cholesterol at 228 and your elevated LDL at 151 I would recommend working on healthy diet and exercise recommendation for exercise is 30 minutes 5 days/week.  As far as dietary recommend eating more whole fruits and vegetables and decreasing processed or fatty foods in your diet.  Hemoglobin A1c is elevated at 6.6, this does indicate diabetes, I would like you to schedule an appointment to discuss this.

## 2019-05-14 NOTE — TELEPHONE ENCOUNTER
Phone Number Called: [phone] (home)       Message: Left VM for pt to call back for results.    Left Message for patient to call back: yes

## 2019-05-14 NOTE — TELEPHONE ENCOUNTER
Phone Number Called: [phone] (home)       Message: Called and spoke to patient. Patient made an apt to come in 05/14/2019    Left Message for patient to call back: no

## 2019-05-15 ENCOUNTER — OFFICE VISIT (OUTPATIENT)
Dept: MEDICAL GROUP | Facility: PHYSICIAN GROUP | Age: 24
End: 2019-05-15
Payer: COMMERCIAL

## 2019-05-15 VITALS
TEMPERATURE: 98.4 F | BODY MASS INDEX: 33.62 KG/M2 | HEART RATE: 83 BPM | DIASTOLIC BLOOD PRESSURE: 68 MMHG | SYSTOLIC BLOOD PRESSURE: 128 MMHG | OXYGEN SATURATION: 98 % | WEIGHT: 262 LBS | RESPIRATION RATE: 16 BRPM | HEIGHT: 74 IN

## 2019-05-15 DIAGNOSIS — I10 ESSENTIAL HYPERTENSION: ICD-10-CM

## 2019-05-15 DIAGNOSIS — R73.09 ELEVATED HEMOGLOBIN A1C: ICD-10-CM

## 2019-05-15 DIAGNOSIS — R73.03 PREDIABETES: ICD-10-CM

## 2019-05-15 DIAGNOSIS — E78.2 MIXED HYPERLIPIDEMIA: ICD-10-CM

## 2019-05-15 LAB
HBA1C MFR BLD: 6.2 % (ref 0–5.6)
INT CON NEG: NEGATIVE
INT CON POS: POSITIVE

## 2019-05-15 PROCEDURE — 99214 OFFICE O/P EST MOD 30 MIN: CPT | Performed by: NURSE PRACTITIONER

## 2019-05-15 PROCEDURE — 83036 HEMOGLOBIN GLYCOSYLATED A1C: CPT | Performed by: NURSE PRACTITIONER

## 2019-05-15 RX ORDER — METFORMIN HYDROCHLORIDE 500 MG/1
500 TABLET, EXTENDED RELEASE ORAL DAILY
Qty: 90 TAB | Refills: 0 | Status: SHIPPED | OUTPATIENT
Start: 2019-05-15 | End: 2021-12-01

## 2021-11-18 ENCOUNTER — OFFICE VISIT (OUTPATIENT)
Dept: URGENT CARE | Facility: CLINIC | Age: 26
End: 2021-11-18
Payer: COMMERCIAL

## 2021-11-18 VITALS
BODY MASS INDEX: 35.78 KG/M2 | TEMPERATURE: 97.6 F | WEIGHT: 270 LBS | HEIGHT: 73 IN | SYSTOLIC BLOOD PRESSURE: 166 MMHG | DIASTOLIC BLOOD PRESSURE: 102 MMHG | HEART RATE: 96 BPM | OXYGEN SATURATION: 98 % | RESPIRATION RATE: 16 BRPM

## 2021-11-18 DIAGNOSIS — R73.03 PREDIABETES: ICD-10-CM

## 2021-11-18 DIAGNOSIS — Z76.0 MEDICATION REFILL: ICD-10-CM

## 2021-11-18 DIAGNOSIS — I10 ESSENTIAL HYPERTENSION: ICD-10-CM

## 2021-11-18 DIAGNOSIS — E66.9 OBESITY (BMI 30-39.9): ICD-10-CM

## 2021-11-18 PROCEDURE — 99214 OFFICE O/P EST MOD 30 MIN: CPT | Performed by: PHYSICIAN ASSISTANT

## 2021-11-18 RX ORDER — LISINOPRIL 20 MG/1
20 TABLET ORAL DAILY
Qty: 30 TABLET | Refills: 0 | Status: SHIPPED | OUTPATIENT
Start: 2021-11-18 | End: 2024-02-01

## 2021-11-19 NOTE — PROGRESS NOTES
Subjective     Leno Jo is a 26 y.o. male who presents with Hypertension Follow-up (x yeserday, high blood pressure, headache)    Medications:    • lisinopril Tabs    Allergies: Patient has no known allergies.    Problem List: Leno Jo does not have any pertinent problems on file.    Surgical History:  No past surgical history on file.    Past Social Hx: Leno Jo  reports that he has never smoked. He has never used smokeless tobacco. He reports current alcohol use. He reports that he does not use drugs.     Past Family Hx:  Leno Jo family history includes Diabetes in his brother and father; No Known Problems in his mother.     Problem list, medications, and allergies reviewed by myself today in Epic.          Patient presents with:  Hypertension x yeserday, high blood pressure, headache. Pt is out of his lisonopril 20mg.  No other complaints.         Hypertension  This is a new problem. The current episode started more than 1 year ago. The problem has been gradually worsening since onset. The problem is controlled. Associated symptoms include headaches. Pertinent negatives include no blurred vision, chest pain, malaise/fatigue, palpitations or peripheral edema. There are no associated agents to hypertension. Risk factors for coronary artery disease include diabetes mellitus, obesity and male gender. Past treatments include ACE inhibitors. There is no history of kidney disease, CVA, heart failure or PVD. obesity.       Review of Systems   Constitutional: Negative for fever and malaise/fatigue.   HENT: Negative for tinnitus.    Eyes: Negative for blurred vision, double vision, photophobia, pain and discharge.   Cardiovascular: Negative for chest pain, palpitations and leg swelling.   Gastrointestinal: Negative for nausea and vomiting.   Neurological: Positive for headaches.   All other systems reviewed and are negative.             Objective     BP (!) 166/102 (BP Location: Left  "arm, Patient Position: Sitting, BP Cuff Size: Large adult)   Pulse 96   Temp 36.4 °C (97.6 °F) (Temporal)   Resp 16   Ht 1.854 m (6' 1\")   Wt 122 kg (270 lb)   SpO2 98%   BMI 35.62 kg/m²      Physical Exam  Vitals and nursing note reviewed.   Constitutional:       General: He is not in acute distress.     Appearance: Normal appearance. He is well-developed. He is obese. He is not ill-appearing or toxic-appearing.   HENT:      Head: Normocephalic and atraumatic.      Right Ear: Tympanic membrane normal.      Left Ear: Tympanic membrane normal.      Nose: Nose normal.      Mouth/Throat:      Lips: Pink.      Mouth: Mucous membranes are moist.      Pharynx: Oropharynx is clear. Uvula midline.   Eyes:      Extraocular Movements: Extraocular movements intact.      Conjunctiva/sclera: Conjunctivae normal.      Pupils: Pupils are equal, round, and reactive to light.   Cardiovascular:      Rate and Rhythm: Normal rate and regular rhythm.      Pulses: Normal pulses.      Heart sounds: Normal heart sounds.   Pulmonary:      Effort: Pulmonary effort is normal.      Breath sounds: Normal breath sounds.   Abdominal:      General: Bowel sounds are normal.      Palpations: Abdomen is soft.   Musculoskeletal:         General: Normal range of motion.      Cervical back: Normal range of motion and neck supple.   Skin:     General: Skin is warm and dry.      Capillary Refill: Capillary refill takes less than 2 seconds.   Neurological:      General: No focal deficit present.      Mental Status: He is alert and oriented to person, place, and time.      Cranial Nerves: No cranial nerve deficit.      Motor: Motor function is intact.      Coordination: Coordination is intact.      Gait: Gait normal.   Psychiatric:         Mood and Affect: Mood normal.                             Assessment & Plan                1. Essential hypertension  lisinopril (PRINIVIL) 20 MG Tab   2. Obesity (BMI 30-39.9)  lisinopril (PRINIVIL) 20 MG Tab   3. " "Prediabetes  lisinopril (PRINIVIL) 20 MG Tab   4. Medication refill         Patient was evaluated in clinic today while wearing appropriate personal protective equipment.      PT has not had HTN medications for \" a while\" because his PCP left then he just never found a new one.  PT does have insurance but \"was not really having any issues until he started having headaches yesterday.\"    Had a long discussion with patient about dangers of going off of his HTN medications: stroke, MI, vision loss, renal failure.  Will send Rx for 30 days of medications with a list of providers for patient to call tomorrow.      PT should follow up with PCP in 1-2 days for re-evaluation if symptoms have not improved.      Discussed red flags and reasons to return to UC or ED.      Pt and/or family verbalized understanding of diagnosis and follow up instructions and was offered informational handout on diagnosis.  PT discharged.     I have spent at least 30 minutes on the care of this patient.  This includes preparing for visit which includes review of previous visits if available in EMR, obtaining HPI, exam and evaluation of patient, ordering and independent interpretation of labs, imaging, tests, medical management, counseling, education and documentation.        "

## 2021-12-01 ASSESSMENT — ENCOUNTER SYMPTOMS
DOUBLE VISION: 0
PALPITATIONS: 0
HYPERTENSION: 1
BLURRED VISION: 0
VOMITING: 0
EYE PAIN: 0
HEADACHES: 1
EYE DISCHARGE: 0
NAUSEA: 0
PHOTOPHOBIA: 0
FEVER: 0

## 2021-12-15 ENCOUNTER — TELEPHONE (OUTPATIENT)
Dept: SCHEDULING | Facility: IMAGING CENTER | Age: 26
End: 2021-12-15

## 2023-02-06 ENCOUNTER — OFFICE VISIT (OUTPATIENT)
Dept: URGENT CARE | Facility: CLINIC | Age: 28
End: 2023-02-06
Payer: COMMERCIAL

## 2023-02-06 ENCOUNTER — HOSPITAL ENCOUNTER (EMERGENCY)
Facility: MEDICAL CENTER | Age: 28
End: 2023-02-06
Attending: EMERGENCY MEDICINE
Payer: COMMERCIAL

## 2023-02-06 ENCOUNTER — HOSPITAL ENCOUNTER (EMERGENCY)
Facility: MEDICAL CENTER | Age: 28
End: 2023-02-06
Payer: COMMERCIAL

## 2023-02-06 VITALS
RESPIRATION RATE: 22 BRPM | HEIGHT: 73 IN | DIASTOLIC BLOOD PRESSURE: 73 MMHG | BODY MASS INDEX: 34.86 KG/M2 | OXYGEN SATURATION: 95 % | HEART RATE: 115 BPM | WEIGHT: 263.01 LBS | TEMPERATURE: 98.4 F | SYSTOLIC BLOOD PRESSURE: 180 MMHG

## 2023-02-06 VITALS
RESPIRATION RATE: 16 BRPM | HEART RATE: 98 BPM | OXYGEN SATURATION: 99 % | BODY MASS INDEX: 35.78 KG/M2 | SYSTOLIC BLOOD PRESSURE: 170 MMHG | WEIGHT: 270 LBS | HEIGHT: 73 IN | TEMPERATURE: 99.2 F | DIASTOLIC BLOOD PRESSURE: 118 MMHG

## 2023-02-06 DIAGNOSIS — I10 HYPERTENSION, UNSPECIFIED TYPE: ICD-10-CM

## 2023-02-06 DIAGNOSIS — I16.0 HYPERTENSIVE URGENCY: ICD-10-CM

## 2023-02-06 LAB
ALBUMIN SERPL BCP-MCNC: 4.2 G/DL (ref 3.2–4.9)
ALBUMIN/GLOB SERPL: 1.1 G/DL
ALP SERPL-CCNC: 77 U/L (ref 30–99)
ALT SERPL-CCNC: 39 U/L (ref 2–50)
ANION GAP SERPL CALC-SCNC: 12 MMOL/L (ref 7–16)
AST SERPL-CCNC: 18 U/L (ref 12–45)
BASOPHILS # BLD AUTO: 1.2 % (ref 0–1.8)
BASOPHILS # BLD: 0.11 K/UL (ref 0–0.12)
BILIRUB SERPL-MCNC: 0.4 MG/DL (ref 0.1–1.5)
BUN SERPL-MCNC: 16 MG/DL (ref 8–22)
CALCIUM ALBUM COR SERPL-MCNC: 8.9 MG/DL (ref 8.5–10.5)
CALCIUM SERPL-MCNC: 9.1 MG/DL (ref 8.4–10.2)
CHLORIDE SERPL-SCNC: 99 MMOL/L (ref 96–112)
CO2 SERPL-SCNC: 25 MMOL/L (ref 20–33)
CREAT SERPL-MCNC: 0.85 MG/DL (ref 0.5–1.4)
EKG IMPRESSION: NORMAL
EOSINOPHIL # BLD AUTO: 0.1 K/UL (ref 0–0.51)
EOSINOPHIL NFR BLD: 1.1 % (ref 0–6.9)
ERYTHROCYTE [DISTWIDTH] IN BLOOD BY AUTOMATED COUNT: 33.4 FL (ref 35.9–50)
GFR SERPLBLD CREATININE-BSD FMLA CKD-EPI: 120 ML/MIN/1.73 M 2
GLOBULIN SER CALC-MCNC: 3.8 G/DL (ref 1.9–3.5)
GLUCOSE SERPL-MCNC: 229 MG/DL (ref 65–99)
HCT VFR BLD AUTO: 49.2 % (ref 42–52)
HGB BLD-MCNC: 17.3 G/DL (ref 14–18)
IMM GRANULOCYTES # BLD AUTO: 0.04 K/UL (ref 0–0.11)
IMM GRANULOCYTES NFR BLD AUTO: 0.4 % (ref 0–0.9)
LYMPHOCYTES # BLD AUTO: 2.61 K/UL (ref 1–4.8)
LYMPHOCYTES NFR BLD: 28.3 % (ref 22–41)
MCH RBC QN AUTO: 29.1 PG (ref 27–33)
MCHC RBC AUTO-ENTMCNC: 35.2 G/DL (ref 33.7–35.3)
MCV RBC AUTO: 82.8 FL (ref 81.4–97.8)
MONOCYTES # BLD AUTO: 0.69 K/UL (ref 0–0.85)
MONOCYTES NFR BLD AUTO: 7.5 % (ref 0–13.4)
NEUTROPHILS # BLD AUTO: 5.67 K/UL (ref 1.82–7.42)
NEUTROPHILS NFR BLD: 61.5 % (ref 44–72)
NRBC # BLD AUTO: 0 K/UL
NRBC BLD-RTO: 0 /100 WBC
PLATELET # BLD AUTO: 293 K/UL (ref 164–446)
PMV BLD AUTO: 10.9 FL (ref 9–12.9)
POTASSIUM SERPL-SCNC: 3.9 MMOL/L (ref 3.6–5.5)
PROT SERPL-MCNC: 8 G/DL (ref 6–8.2)
RBC # BLD AUTO: 5.94 M/UL (ref 4.7–6.1)
SODIUM SERPL-SCNC: 136 MMOL/L (ref 135–145)
WBC # BLD AUTO: 9.2 K/UL (ref 4.8–10.8)

## 2023-02-06 PROCEDURE — 99285 EMERGENCY DEPT VISIT HI MDM: CPT

## 2023-02-06 PROCEDURE — 36415 COLL VENOUS BLD VENIPUNCTURE: CPT

## 2023-02-06 PROCEDURE — 700102 HCHG RX REV CODE 250 W/ 637 OVERRIDE(OP): Performed by: EMERGENCY MEDICINE

## 2023-02-06 PROCEDURE — 700111 HCHG RX REV CODE 636 W/ 250 OVERRIDE (IP): Performed by: EMERGENCY MEDICINE

## 2023-02-06 PROCEDURE — 96376 TX/PRO/DX INJ SAME DRUG ADON: CPT

## 2023-02-06 PROCEDURE — 96374 THER/PROPH/DIAG INJ IV PUSH: CPT

## 2023-02-06 PROCEDURE — 99215 OFFICE O/P EST HI 40 MIN: CPT | Performed by: PHYSICIAN ASSISTANT

## 2023-02-06 PROCEDURE — A9270 NON-COVERED ITEM OR SERVICE: HCPCS | Performed by: EMERGENCY MEDICINE

## 2023-02-06 PROCEDURE — 80053 COMPREHEN METABOLIC PANEL: CPT

## 2023-02-06 PROCEDURE — 85025 COMPLETE CBC W/AUTO DIFF WBC: CPT

## 2023-02-06 PROCEDURE — 93005 ELECTROCARDIOGRAM TRACING: CPT | Performed by: EMERGENCY MEDICINE

## 2023-02-06 RX ORDER — HYDRALAZINE HYDROCHLORIDE 20 MG/ML
20 INJECTION INTRAMUSCULAR; INTRAVENOUS ONCE
Status: COMPLETED | OUTPATIENT
Start: 2023-02-06 | End: 2023-02-06

## 2023-02-06 RX ORDER — LOSARTAN POTASSIUM 50 MG/1
50 TABLET ORAL DAILY
Qty: 30 TABLET | Refills: 2 | Status: SHIPPED | OUTPATIENT
Start: 2023-02-06 | End: 2024-02-01

## 2023-02-06 RX ORDER — HYDRALAZINE HYDROCHLORIDE 20 MG/ML
10 INJECTION INTRAMUSCULAR; INTRAVENOUS ONCE
Status: COMPLETED | OUTPATIENT
Start: 2023-02-06 | End: 2023-02-06

## 2023-02-06 RX ORDER — LOSARTAN POTASSIUM 25 MG/1
50 TABLET ORAL ONCE
Status: COMPLETED | OUTPATIENT
Start: 2023-02-06 | End: 2023-02-06

## 2023-02-06 RX ADMIN — HYDRALAZINE HYDROCHLORIDE 10 MG: 20 INJECTION INTRAMUSCULAR; INTRAVENOUS at 19:15

## 2023-02-06 RX ADMIN — HYDRALAZINE HYDROCHLORIDE 20 MG: 20 INJECTION INTRAMUSCULAR; INTRAVENOUS at 21:00

## 2023-02-06 RX ADMIN — HYDRALAZINE HYDROCHLORIDE 10 MG: 20 INJECTION INTRAMUSCULAR; INTRAVENOUS at 20:15

## 2023-02-06 RX ADMIN — LOSARTAN POTASSIUM 50 MG: 25 TABLET, FILM COATED ORAL at 20:15

## 2023-02-06 ASSESSMENT — ENCOUNTER SYMPTOMS
HEADACHES: 0
SHORTNESS OF BREATH: 0
HYPERTENSION: 1
ORTHOPNEA: 0
SWEATS: 0
BLURRED VISION: 0
PALPITATIONS: 1
PND: 0

## 2023-02-07 ENCOUNTER — PATIENT OUTREACH (OUTPATIENT)
Dept: SCHEDULING | Facility: IMAGING CENTER | Age: 28
End: 2023-02-07
Payer: COMMERCIAL

## 2023-02-07 ENCOUNTER — TELEPHONE (OUTPATIENT)
Dept: HEALTH INFORMATION MANAGEMENT | Facility: OTHER | Age: 28
End: 2023-02-07
Payer: COMMERCIAL

## 2023-02-07 NOTE — ED PROVIDER NOTES
"ED Provider Note    CHIEF COMPLAINT  Chief Complaint   Patient presents with    Hypertension     Was at a dentist appt and was told BP was too high and was told to come to ED for further evaluation   Stopped taking BP meds 1 yr ago        EXTERNAL RECORDS REVIEWED  Outpatient Notes primary care visit November 2021, treated for hypertension at the time with lisinopril.    HPI/ROS  LIMITATION TO HISTORY   Select: : None  OUTSIDE HISTORIAN(S):  None    Leno Jo is a 27 y.o. male who presents hypertensive, sent from dentist office.  He was attempting to have oral surgery today however this was canceled when he was found to be extremely hypertensive.  Patient stopped taking lisinopril over a year ago stating it gave him a bad cough.  He never followed up to change his medication.  No chest pain, no headache.  He does report history of headaches in the past however not currently.  No acute numbness weakness to extremities.  He denies urinary difficulties, no leg swelling.    PAST MEDICAL HISTORY   has a past medical history of Hypertension.    SURGICAL HISTORY  patient denies any surgical history    FAMILY HISTORY  Family History   Problem Relation Age of Onset    No Known Problems Mother     Diabetes Father     Diabetes Brother        SOCIAL HISTORY  Social History     Tobacco Use    Smoking status: Never    Smokeless tobacco: Never   Substance and Sexual Activity    Alcohol use: Yes     Comment: occ     Drug use: No    Sexual activity: Yes     Partners: Female       CURRENT MEDICATIONS  Home Medications       Reviewed by Tracie Garcia R.N. (Registered Nurse) on 02/06/23 at 1824  Med List Status: Not Addressed     Medication Last Dose Status   lisinopril (PRINIVIL) 20 MG Tab  Active                    ALLERGIES  No Known Allergies    PHYSICAL EXAM  VITAL SIGNS: BP (!) 190/101   Pulse 94   Temp 36.3 °C (97.4 °F) (Temporal)   Resp 17   Ht 1.854 m (6' 1\")   Wt 119 kg (263 lb 0.1 oz)   SpO2 95%   BMI " 34.70 kg/m²    Cardiac: Heart is regular and no murmur  Skin: No peripheral edema  GI: Abdomen soft, nontender  Neurologic: Sensation and strength are normal, speech clear  Psychiatric: Patient is calm and cooperative  Respiratory: Lungs clear, no crackles or wheezing    DIAGNOSTIC STUDIES / PROCEDURES  EKG  I have independently interpreted this EKG  See below    LABS  Results for orders placed or performed during the hospital encounter of 02/06/23   CBC WITH DIFFERENTIAL   Result Value Ref Range    WBC 9.2 4.8 - 10.8 K/uL    RBC 5.94 4.70 - 6.10 M/uL    Hemoglobin 17.3 14.0 - 18.0 g/dL    Hematocrit 49.2 42.0 - 52.0 %    MCV 82.8 81.4 - 97.8 fL    MCH 29.1 27.0 - 33.0 pg    MCHC 35.2 33.7 - 35.3 g/dL    RDW 33.4 (L) 35.9 - 50.0 fL    Platelet Count 293 164 - 446 K/uL    MPV 10.9 9.0 - 12.9 fL    Neutrophils-Polys 61.50 44.00 - 72.00 %    Lymphocytes 28.30 22.00 - 41.00 %    Monocytes 7.50 0.00 - 13.40 %    Eosinophils 1.10 0.00 - 6.90 %    Basophils 1.20 0.00 - 1.80 %    Immature Granulocytes 0.40 0.00 - 0.90 %    Nucleated RBC 0.00 /100 WBC    Neutrophils (Absolute) 5.67 1.82 - 7.42 K/uL    Lymphs (Absolute) 2.61 1.00 - 4.80 K/uL    Monos (Absolute) 0.69 0.00 - 0.85 K/uL    Eos (Absolute) 0.10 0.00 - 0.51 K/uL    Baso (Absolute) 0.11 0.00 - 0.12 K/uL    Immature Granulocytes (abs) 0.04 0.00 - 0.11 K/uL    NRBC (Absolute) 0.00 K/uL   COMP METABOLIC PANEL   Result Value Ref Range    Sodium 136 135 - 145 mmol/L    Potassium 3.9 3.6 - 5.5 mmol/L    Chloride 99 96 - 112 mmol/L    Co2 25 20 - 33 mmol/L    Anion Gap 12.0 7.0 - 16.0    Glucose 229 (H) 65 - 99 mg/dL    Bun 16 8 - 22 mg/dL    Creatinine 0.85 0.50 - 1.40 mg/dL    Calcium 9.1 8.4 - 10.2 mg/dL    AST(SGOT) 18 12 - 45 U/L    ALT(SGPT) 39 2 - 50 U/L    Alkaline Phosphatase 77 30 - 99 U/L    Total Bilirubin 0.4 0.1 - 1.5 mg/dL    Albumin 4.2 3.2 - 4.9 g/dL    Total Protein 8.0 6.0 - 8.2 g/dL    Globulin 3.8 (H) 1.9 - 3.5 g/dL    A-G Ratio 1.1 g/dL   CORRECTED  CALCIUM   Result Value Ref Range    Correct Calcium 8.9 8.5 - 10.5 mg/dL   ESTIMATED GFR   Result Value Ref Range    GFR (CKD-EPI) 120 >60 mL/min/1.73 m 2   EKG   Result Value Ref Range    Report       [facility] Emergency Dept.    Test Date:  2023  Pt Name:    STEWART KENDRICK                 Department: [facility]  MRN:        [id number]                      [facility]  Gender:     Male                         Technician: ALANNA  :        1995                   Requested By:THOMAS ARMSTRONG  Order #:    [id number]                    Reading MD: THOMAS ARMSTRONG MD    Measurements  Intervals                                Axis  Rate:       90                           P:          15  AZ:         148                          QRS:        93  QRSD:       90                           T:          -64  QT:         324  QTc:        397    Interpretive Statements  SINUS RHYTHM  BORDERLINE RIGHT AXIS DEVIATION  BORDERLINE T ABNORMALITIES, INFERIOR LEADS  No previous ECG available for comparison  no ischemia, no arrhythmia  Electronically Signed On 2023 21:10:19 PST by THOMAS ARMSTRONG MD               COURSE & MEDICAL DECISION MAKING    ED Observation Status? Yes; I am placing the patient in to an observation status due to a diagnostic uncertainty as well as therapeutic intensity. Patient placed in observation status at 7:45 PM, 2023.     Observation plan is as follows: Repeat exam.  Blood work to establish normal renal function, rule out infection.  Medication to control blood pressure.    Upon Reevaluation, the patient's condition has: Improved; and will be discharged.    Patient discharged from ED Observation status at 9:15 PM (Time) 2023 (Date).     INITIAL ASSESSMENT, COURSE AND PLAN  Care Narrative: Patient presents extremely hypertensive, initial blood pressure 226/152.  This is asymptomatic this time, no headache, no chest pain, no neurologic compromise.   Blood work was unremarkable other than slight hyperglycemia, he was advised of this and the need to follow-up the primary care doctor for recheck.  Patient received IV hydralazine, 2 doses of 10 mg initially followed by 20 mg.  Blood pressure improved to 180/73.  He remains neurologically intact on reexam.  Patient was started on oral losartan, will continue on 50 mg daily.  He is advised to keep a log of his blood pressures to discuss with his primary care doctor.  Patient is trying to establish with a new primary care doctor.  Patient is advised to return if worse or for any concerns.  He is well-appearing upon discharge      HTN/IDDM FOLLOW UP:  The patient is referred to a primary physician for blood pressure management, diabetic screening, and for all other preventive health concerns      ADDITIONAL PROBLEM LIST  Hyperglycemia: Unknown etiology, follow-up with primary care doctor    Medication noncompliance: Patient stopped lisinopril secondary to a cough, will be started on a new hypertensive medication    DISPOSITION AND DISCUSSIONS    Escalation of care considered, and ultimately not performed:acute inpatient care management, however at this time, the patient is most appropriate for outpatient management    Barriers to care at this time, including but not limited to: Patient does not have established PCP.     Decision tools and prescription drugs considered including, but not limited to: Medication modification switch to losartan for blood pressure control .    FINAL DIAGNOSIS  1. Hypertension, unspecified type           Electronically signed by: Leonid Black M.D., 2/6/2023 7:45 PM

## 2023-02-07 NOTE — ED TRIAGE NOTES
"Chief Complaint   Patient presents with    Hypertension     Was at a dentist appt and was told BP was too high and was told to come to ED for further evaluation   Stopped taking BP meds 1 yr ago        BP (!) 226/152   Pulse 90   Temp 36.3 °C (97.4 °F) (Temporal)   Resp 18   Ht 1.854 m (6' 1\")   Wt 119 kg (263 lb 0.1 oz)   SpO2 96%   BMI 34.70 kg/m²      "

## 2023-02-07 NOTE — PROGRESS NOTES
Subjective:   Leno Jo is a 27 y.o. male who presents for Hypertension  This is a pleasant 27-year-old male who presents today with concerns for elevated blood pressure.  He was seen at the dentist today requiring some dental work.  His blood pressure was noted to be significantly elevated and he was referred here for further evaluation.  Patient has a documented history of hypertension and has been noncompliant with treatment.  He has not had a prescription for antihypertensive since November 2021.  That was prescribed by the urgent care.  He was referred to primary care urgently after this and did not follow-up.  Upon arrival today his blood pressure is 180/120.  Upon questioning he does endorse intermittent chest pain as well as lightheadedness.  He does not have any chest pain currently.  He has not had any routine lab work since 2019.  He has not established with primary care.  He reports the last time he was seen by primary care several years ago he was diagnosed with prediabetes and hyperlipidemia.  He is not taking any blood pressure elevating medications.  He does endorse significant stress.      Hypertension  This is a chronic problem. The current episode started more than 1 year ago. The problem has been gradually worsening since onset. The problem is uncontrolled. Associated symptoms include chest pain and palpitations. Pertinent negatives include no blurred vision, headaches, orthopnea, peripheral edema, PND, shortness of breath or sweats. There are no associated agents to hypertension. Risk factors for coronary artery disease include diabetes mellitus, family history, stress, male gender and dyslipidemia. Past treatments include ACE inhibitors. Compliance problems include medication side effects.  There is no history of kidney disease.     Review of Systems   Eyes:  Negative for blurred vision.   Respiratory:  Negative for shortness of breath.    Cardiovascular:  Positive for chest pain and  "palpitations. Negative for orthopnea and PND.   Neurological:  Negative for headaches.     Medications:  lisinopril Tabs    Allergies:             Patient has no known allergies.    Surgical History:       No past surgical history on file.    Past Social Hx:  Leno Jo  reports that he has never smoked. He has never used smokeless tobacco. He reports current alcohol use. He reports that he does not use drugs.     Past Family Hx:   Leno Jo family history includes Diabetes in his brother and father; No Known Problems in his mother.       Problem list, medications, and allergies reviewed by myself today in Epic.     Objective:     BP (!) 170/118 (BP Location: Left arm, Patient Position: Sitting, BP Cuff Size: Large adult)   Pulse 98   Temp 37.3 °C (99.2 °F) (Temporal)   Resp 16   Ht 1.854 m (6' 1\")   Wt 122 kg (270 lb)   SpO2 99%   BMI 35.62 kg/m²     Physical Exam  Vitals and nursing note reviewed.   Constitutional:       General: He is not in acute distress.     Appearance: Normal appearance. He is not ill-appearing or toxic-appearing.   HENT:      Head: Normocephalic.      Right Ear: Tympanic membrane normal.      Left Ear: Tympanic membrane normal.      Nose: Nose normal. No congestion or rhinorrhea.      Mouth/Throat:      Mouth: Mucous membranes are moist.      Pharynx: Oropharynx is clear. No oropharyngeal exudate or posterior oropharyngeal erythema.   Eyes:      Extraocular Movements: Extraocular movements intact.      Conjunctiva/sclera: Conjunctivae normal.   Cardiovascular:      Rate and Rhythm: Normal rate and regular rhythm.      Pulses: Normal pulses.      Heart sounds: Normal heart sounds. No murmur heard.  Pulmonary:      Effort: Pulmonary effort is normal. No tachypnea, respiratory distress or retractions.      Breath sounds: Normal breath sounds. No stridor. No wheezing, rhonchi or rales.   Abdominal:      General: There is no distension.      Palpations: Abdomen is soft.      " Tenderness: There is no abdominal tenderness. There is no guarding or rebound.   Musculoskeletal:      Cervical back: Normal range of motion.   Lymphadenopathy:      Cervical: No cervical adenopathy.   Skin:     General: Skin is warm.   Neurological:      General: No focal deficit present.      Mental Status: He is alert and oriented to person, place, and time.       Assessment/Plan:     Diagnosis and Associated Orders:     1. Hypertensive urgency  - Referral to establish with [facility] PCP        Comments/MDM:    Patient presents with blood pressure of 180/120.  Taken twice without improvement.  He has been shown to be noncompliant with blood pressure medications.  His last prescription was prescribed in the urgent care in November 2021.  This was a 1 month supply and he has not been seen since.  He has history of prediabetes, mixed hyperlipidemia and obesity.  He is having intermittent chest pain, not actively, as well as palpitations and lightheadedness.  He has no PCP.  I am concerned about follow-up for this patient based on symptomatic hypertension.  I am recommending higher level of care.  Discussed need for laboratory studies to rule out endorgan dysfunction.  Patient will proceed to [facility].    I personally reviewed prior external notes and test results pertinent to today's visit.  Red flags discussed as well as indications to present to the Emergency Department.  Supportive care, natural history, differential diagnoses, and indications for immediate follow-up discussed.  Patient expresses understanding and agrees to plan.  Patient denies any other questions or concerns.    Follow-up with the primary care physician for recheck, reevaluation, and consideration of further management.      Please note that this dictation was created using voice recognition software. I have made a reasonable attempt to correct obvious errors, but I expect that there are errors of grammar and possibly content that I did not  discover before finalizing the note.    This note was electronically signed by Samanta Hernández PA-C

## 2023-02-07 NOTE — DISCHARGE INSTRUCTIONS
Obtain primary care doctor.  Check your blood pressure daily and keep a log to discuss with your new doctor.  Return for headache, chest pain, or any concerns.

## 2023-02-09 ENCOUNTER — PATIENT OUTREACH (OUTPATIENT)
Dept: SCHEDULING | Facility: IMAGING CENTER | Age: 28
End: 2023-02-09
Payer: COMMERCIAL

## 2024-02-01 ENCOUNTER — TELEPHONE (OUTPATIENT)
Dept: MEDICAL GROUP | Facility: PHYSICIAN GROUP | Age: 29
End: 2024-02-01

## 2024-02-01 ENCOUNTER — OFFICE VISIT (OUTPATIENT)
Dept: MEDICAL GROUP | Facility: PHYSICIAN GROUP | Age: 29
End: 2024-02-01
Payer: COMMERCIAL

## 2024-02-01 VITALS
HEART RATE: 88 BPM | WEIGHT: 259.8 LBS | TEMPERATURE: 98.5 F | OXYGEN SATURATION: 95 % | HEIGHT: 73 IN | BODY MASS INDEX: 34.43 KG/M2 | SYSTOLIC BLOOD PRESSURE: 158 MMHG | DIASTOLIC BLOOD PRESSURE: 98 MMHG

## 2024-02-01 DIAGNOSIS — R73.03 PREDIABETES: ICD-10-CM

## 2024-02-01 DIAGNOSIS — I10 ESSENTIAL HYPERTENSION: ICD-10-CM

## 2024-02-01 DIAGNOSIS — J06.9 ACUTE URI: ICD-10-CM

## 2024-02-01 DIAGNOSIS — E78.2 MIXED HYPERLIPIDEMIA: ICD-10-CM

## 2024-02-01 DIAGNOSIS — J02.9 SORE THROAT: ICD-10-CM

## 2024-02-01 LAB
FLUAV RNA SPEC QL NAA+PROBE: NEGATIVE
FLUBV RNA SPEC QL NAA+PROBE: NEGATIVE
RSV RNA SPEC QL NAA+PROBE: NEGATIVE
S PYO DNA SPEC NAA+PROBE: NOT DETECTED
SARS-COV-2 RNA RESP QL NAA+PROBE: NEGATIVE

## 2024-02-01 PROCEDURE — 3080F DIAST BP >= 90 MM HG: CPT | Performed by: STUDENT IN AN ORGANIZED HEALTH CARE EDUCATION/TRAINING PROGRAM

## 2024-02-01 PROCEDURE — 3077F SYST BP >= 140 MM HG: CPT | Performed by: STUDENT IN AN ORGANIZED HEALTH CARE EDUCATION/TRAINING PROGRAM

## 2024-02-01 PROCEDURE — 99214 OFFICE O/P EST MOD 30 MIN: CPT | Performed by: STUDENT IN AN ORGANIZED HEALTH CARE EDUCATION/TRAINING PROGRAM

## 2024-02-01 PROCEDURE — 0241U POCT CEPHEID COV-2, FLU A/B, RSV - PCR: CPT | Performed by: STUDENT IN AN ORGANIZED HEALTH CARE EDUCATION/TRAINING PROGRAM

## 2024-02-01 PROCEDURE — 87651 STREP A DNA AMP PROBE: CPT | Performed by: STUDENT IN AN ORGANIZED HEALTH CARE EDUCATION/TRAINING PROGRAM

## 2024-02-01 RX ORDER — LOSARTAN POTASSIUM 50 MG/1
50 TABLET ORAL DAILY
Qty: 90 TABLET | Refills: 1 | Status: SHIPPED | OUTPATIENT
Start: 2024-02-01

## 2024-02-01 ASSESSMENT — ENCOUNTER SYMPTOMS
VOMITING: 0
SORE THROAT: 1
NAUSEA: 0
MYALGIAS: 1
WHEEZING: 0
CHILLS: 0
HEADACHES: 0
FEVER: 0
SHORTNESS OF BREATH: 0
DIARRHEA: 0
DIZZINESS: 0
COUGH: 1

## 2024-02-01 ASSESSMENT — FIBROSIS 4 INDEX: FIB4 SCORE: 0.3

## 2024-02-01 NOTE — TELEPHONE ENCOUNTER
Phone Number Called: [phone] (home)       Call outcome: Spoke to patient regarding message below.    Message: Attempted to call pt 3 times with no answer, was unable to leave vm. Will wait for call from pt.

## 2024-02-01 NOTE — PROGRESS NOTES
Subjective:     CC:    Chief Complaint   Patient presents with    Establish Care    Pharyngitis    Cough    Runny Nose        HISTORY OF THE PRESENT ILLNESS: Patient is a 28 y.o. male. This pleasant patient is here today to establish care and discuss the following conditions. Prior PCP was TRUONG Muhammad.  Has not had primary care for about 5 years.    Problem   Acute URI    Symptoms x 2 days. Started with a sore throat. Associated nasal congestion, runny nose, dry cough. Denies any fevers or chills. No N/V/D. No skin rashes.      Prediabetes    Last A1c 6.2% on 5/15/2019, has not had interval labs since that time.     Mixed Hyperlipidemia    Chronic in nature. Not on any medications.    Lab Results   Component Value Date/Time    CHOLSTRLTOT 228 (H) 05/13/2019 1311    TRIGLYCERIDE 146 05/13/2019 1311    HDL 48 05/13/2019 1311     (H) 05/13/2019 1311        Essential Hypertension    Chronic condition.  Current medications: none  Was taking losartan but ran out about a year ago.  Previously took lisinopril but this caused a cough.  No headaches, dizziness, CP, SOB, leg swelling.           Past Medical History: Diagnoses of Acute URI, Sore throat, Essential hypertension, Prediabetes, and Mixed hyperlipidemia were pertinent to this visit.    Current Outpatient Medications   Medication Sig    losartan (COZAAR) 50 MG Tab Take 1 Tablet by mouth every day.        Social History     Socioeconomic History    Marital status: Single   Tobacco Use    Smoking status: Never    Smokeless tobacco: Never   Substance and Sexual Activity    Alcohol use: Yes     Comment: occ     Drug use: No    Sexual activity: Yes     Partners: Female       Family History   Problem Relation Age of Onset    No Known Problems Mother     Diabetes Father     Diabetes Brother          Health Maintenance: Completed      ROS:   Review of Systems   Constitutional:  Positive for malaise/fatigue. Negative for chills and fever.   HENT:   "Positive for congestion and sore throat. Negative for ear pain.    Respiratory:  Positive for cough. Negative for shortness of breath and wheezing.    Cardiovascular:  Negative for chest pain.   Gastrointestinal:  Negative for diarrhea, nausea and vomiting.   Musculoskeletal:  Positive for myalgias.   Skin:  Negative for rash.   Neurological:  Negative for dizziness and headaches.       Objective:       Exam: BP (!) 158/98 (BP Location: Left arm, Patient Position: Sitting, BP Cuff Size: Adult)   Pulse 88   Temp 36.9 °C (98.5 °F) (Temporal)   Ht 1.854 m (6' 1\")   Wt 118 kg (259 lb 12.8 oz)   SpO2 95%  Body mass index is 34.28 kg/m².    Physical Exam  Constitutional:       General: He is not in acute distress.  HENT:      Right Ear: Tympanic membrane, ear canal and external ear normal.      Left Ear: Tympanic membrane, ear canal and external ear normal.      Nose: No rhinorrhea.      Mouth/Throat:      Mouth: Mucous membranes are moist.      Pharynx: Oropharynx is clear.      Comments: Posterior oropharynx is erythematous.  Mild edema bilateral tonsils.  No exudate.  Eyes:      Extraocular Movements: Extraocular movements intact.      Conjunctiva/sclera: Conjunctivae normal.      Pupils: Pupils are equal, round, and reactive to light.   Cardiovascular:      Rate and Rhythm: Normal rate and regular rhythm.      Heart sounds: No murmur heard.     No friction rub. No gallop.   Pulmonary:      Effort: Pulmonary effort is normal. No respiratory distress.      Breath sounds: No wheezing, rhonchi or rales.   Musculoskeletal:      Cervical back: Neck supple.   Lymphadenopathy:      Cervical: No cervical adenopathy.   Skin:     General: Skin is warm and dry.   Neurological:      Mental Status: He is alert.   Psychiatric:         Mood and Affect: Mood normal.         Labs:     Results for orders placed or performed in visit on 02/01/24   POCT CoV-2, Flu A/B, RSV by PCR   Result Value Ref Range    SARS-CoV-2 by PCR Negative " Negative, Invalid    Influenza virus A RNA Negative Negative, Invalid    Influenza virus B, PCR Negative Negative, Invalid    RSV, PCR Negative Negative, Invalid   POCT CEPHEID GROUP A STREP - PCR   Result Value Ref Range    POC Group A Strep, PCR Not Detected Not Detected, Invalid         Assessment & Plan:     28 y.o. male with the following -    1. Acute URI  2. Sore throat  Presentation is consistent with viral etiology. Symptoms onset 2 days ago. Physical exam is benign and vital signs WNL. I have explained to the patient that antibiotics are not indicated at this time and discussed expected course of illness. Reviewed supportive treatment options, including over the counter medications which may be helpful for symptom relief. Patient was instructed to contact the clinic or return if symptoms worsen or fail to improve after 7-10 days of symptoms, although I have explained that it is not uncommon for some symptoms to linger for several weeks.   - POCT CoV-2, Flu A/B, RSV by PCR  - POCT CEPHEID GROUP A STREP - PCR    3. Essential hypertension  Chronic, uncontrolled.  Restart losartan 50 mg daily.  Encourage patient to get labs done.  - Comp Metabolic Panel; Future  - losartan (COZAAR) 50 MG Tab; Take 1 Tablet by mouth every day.  Dispense: 90 Tablet; Refill: 1    4. Prediabetes  History of prediabetes, has not had recent labs.    - HEMOGLOBIN A1C; Future    5. Mixed hyperlipidemia  - Lipid Profile; Future        Return in about 4 weeks (around 2/29/2024) for follow up.    Please note that this dictation was created using voice recognition software. I have made every reasonable attempt to correct obvious errors, but I expect that there are errors of grammar and possibly content that I did not discover before finalizing the note.

## 2024-02-01 NOTE — TELEPHONE ENCOUNTER
----- Message from Noemi Mason P.A.-C. sent at 2/1/2024  9:46 AM PST -----  Please let patient know that his results are negative for strep, COVID, flu, and RSV.

## 2024-02-29 ENCOUNTER — APPOINTMENT (OUTPATIENT)
Dept: MEDICAL GROUP | Facility: PHYSICIAN GROUP | Age: 29
End: 2024-02-29
Payer: COMMERCIAL

## 2024-06-25 ENCOUNTER — HOSPITAL ENCOUNTER (OUTPATIENT)
Dept: LAB | Facility: MEDICAL CENTER | Age: 29
End: 2024-06-25
Attending: STUDENT IN AN ORGANIZED HEALTH CARE EDUCATION/TRAINING PROGRAM
Payer: COMMERCIAL

## 2024-06-25 DIAGNOSIS — R73.03 PREDIABETES: ICD-10-CM

## 2024-06-25 DIAGNOSIS — I10 ESSENTIAL HYPERTENSION: ICD-10-CM

## 2024-06-25 DIAGNOSIS — E78.2 MIXED HYPERLIPIDEMIA: ICD-10-CM

## 2024-06-25 LAB
ALBUMIN SERPL BCP-MCNC: 4.4 G/DL (ref 3.2–4.9)
ALBUMIN/GLOB SERPL: 1.2 G/DL
ALP SERPL-CCNC: 82 U/L (ref 30–99)
ALT SERPL-CCNC: 41 U/L (ref 2–50)
ANION GAP SERPL CALC-SCNC: 19 MMOL/L (ref 7–16)
AST SERPL-CCNC: 26 U/L (ref 12–45)
BILIRUB SERPL-MCNC: 0.5 MG/DL (ref 0.1–1.5)
BUN SERPL-MCNC: 14 MG/DL (ref 8–22)
CALCIUM ALBUM COR SERPL-MCNC: 8.9 MG/DL (ref 8.5–10.5)
CALCIUM SERPL-MCNC: 9.2 MG/DL (ref 8.5–10.5)
CHLORIDE SERPL-SCNC: 98 MMOL/L (ref 96–112)
CHOLEST SERPL-MCNC: 322 MG/DL (ref 100–199)
CO2 SERPL-SCNC: 18 MMOL/L (ref 20–33)
CREAT SERPL-MCNC: 0.62 MG/DL (ref 0.5–1.4)
EST. AVERAGE GLUCOSE BLD GHB EST-MCNC: 349 MG/DL
FASTING STATUS PATIENT QL REPORTED: NORMAL
GFR SERPLBLD CREATININE-BSD FMLA CKD-EPI: 131 ML/MIN/1.73 M 2
GLOBULIN SER CALC-MCNC: 3.6 G/DL (ref 1.9–3.5)
GLUCOSE SERPL-MCNC: 272 MG/DL (ref 65–99)
HBA1C MFR BLD: 13.8 % (ref 4–5.6)
HDLC SERPL-MCNC: 33 MG/DL
LDLC SERPL CALC-MCNC: ABNORMAL MG/DL
POTASSIUM SERPL-SCNC: 3.8 MMOL/L (ref 3.6–5.5)
PROT SERPL-MCNC: 8 G/DL (ref 6–8.2)
SODIUM SERPL-SCNC: 135 MMOL/L (ref 135–145)
TRIGL SERPL-MCNC: 1077 MG/DL (ref 0–149)

## 2024-06-25 PROCEDURE — 80053 COMPREHEN METABOLIC PANEL: CPT

## 2024-06-25 PROCEDURE — 83036 HEMOGLOBIN GLYCOSYLATED A1C: CPT

## 2024-06-25 PROCEDURE — 36415 COLL VENOUS BLD VENIPUNCTURE: CPT

## 2024-06-25 PROCEDURE — 80061 LIPID PANEL: CPT

## 2024-06-26 ENCOUNTER — OFFICE VISIT (OUTPATIENT)
Dept: MEDICAL GROUP | Facility: PHYSICIAN GROUP | Age: 29
End: 2024-06-26
Payer: COMMERCIAL

## 2024-06-26 VITALS
DIASTOLIC BLOOD PRESSURE: 102 MMHG | HEIGHT: 72 IN | SYSTOLIC BLOOD PRESSURE: 160 MMHG | TEMPERATURE: 97.9 F | BODY MASS INDEX: 31.83 KG/M2 | OXYGEN SATURATION: 97 % | WEIGHT: 235 LBS | HEART RATE: 84 BPM

## 2024-06-26 DIAGNOSIS — E78.1 HYPERTRIGLYCERIDEMIA: ICD-10-CM

## 2024-06-26 DIAGNOSIS — E11.9 TYPE 2 DIABETES MELLITUS WITHOUT COMPLICATION, WITHOUT LONG-TERM CURRENT USE OF INSULIN (HCC): ICD-10-CM

## 2024-06-26 DIAGNOSIS — E78.2 MIXED HYPERLIPIDEMIA: ICD-10-CM

## 2024-06-26 DIAGNOSIS — Z11.59 NEED FOR HEPATITIS C SCREENING TEST: ICD-10-CM

## 2024-06-26 DIAGNOSIS — I10 ESSENTIAL HYPERTENSION: ICD-10-CM

## 2024-06-26 DIAGNOSIS — Z11.4 SCREENING FOR HIV (HUMAN IMMUNODEFICIENCY VIRUS): ICD-10-CM

## 2024-06-26 PROBLEM — J06.9 ACUTE URI: Status: RESOLVED | Noted: 2024-02-01 | Resolved: 2024-06-26

## 2024-06-26 RX ORDER — LOSARTAN POTASSIUM 50 MG/1
50 TABLET ORAL DAILY
Qty: 90 TABLET | Refills: 0 | Status: SHIPPED | OUTPATIENT
Start: 2024-06-26

## 2024-06-26 RX ORDER — LANCETS 30 GAUGE
EACH MISCELLANEOUS
Qty: 100 EACH | Refills: 2 | Status: SHIPPED | OUTPATIENT
Start: 2024-06-26

## 2024-06-26 RX ORDER — ATORVASTATIN CALCIUM 20 MG/1
20 TABLET, FILM COATED ORAL NIGHTLY
Qty: 90 TABLET | Refills: 0 | Status: SHIPPED | OUTPATIENT
Start: 2024-06-26

## 2024-06-26 RX ORDER — FENOFIBRATE 145 MG/1
145 TABLET, COATED ORAL DAILY
Qty: 90 TABLET | Refills: 0 | Status: SHIPPED | OUTPATIENT
Start: 2024-06-26

## 2024-06-26 RX ORDER — ISOPROPYL ALCOHOL 0.7 ML/1
SWAB TOPICAL
Qty: 100 EACH | Refills: 0 | Status: SHIPPED | OUTPATIENT
Start: 2024-06-26

## 2024-06-26 RX ORDER — BLOOD-GLUCOSE METER
EACH MISCELLANEOUS
Qty: 1 KIT | Refills: 0 | Status: SHIPPED | OUTPATIENT
Start: 2024-06-26

## 2024-06-26 ASSESSMENT — PATIENT HEALTH QUESTIONNAIRE - PHQ9
SUM OF ALL RESPONSES TO PHQ QUESTIONS 1-9: 5
CLINICAL INTERPRETATION OF PHQ2 SCORE: 1
5. POOR APPETITE OR OVEREATING: 1 - SEVERAL DAYS

## 2024-06-26 ASSESSMENT — FIBROSIS 4 INDEX: FIB4 SCORE: 0.43

## 2024-06-26 ASSESSMENT — ENCOUNTER SYMPTOMS
HEADACHES: 0
SHORTNESS OF BREATH: 0
FEVER: 0
DIZZINESS: 0
CHILLS: 0

## 2024-06-26 NOTE — PROGRESS NOTES
Subjective:     CC:   Chief Complaint   Patient presents with    Diabetes       HPI:   Leno presents today with    Problem   Type 2 Diabetes Mellitus Without Complication, Without Long-Term Current Use of Insulin (Hcc)    This is a new diagnosis. Has history of prediabetes, A1c 6.2% in 2019 and had not been followed up. A1c on recent labs is 13.8%. The patient has never been treated for diabetes in the past.     Mixed Hyperlipidemia    This is chronic in nature.  Current medications: none    Lab Results   Component Value Date/Time    CHOLSTRLTOT 322 (H) 06/25/2024 1030    TRIGLYCERIDE 1077 (H) 06/25/2024 1030    HDL 33 (A) 06/25/2024 1030    LDL see below 06/25/2024 1030     Prior labs from 2019 without elevated triglycerides.     Essential Hypertension    Chronic condition.  Current medications: none  Previously on losartan. Restarted this medication several months ago but has not been compliant with it.  States that he stopped taking it after a couple of weeks because he forgot about it.  BP today is 160/102  Denies headaches, dizziness, chest pain, shortness of breath, leg swelling.     Acute URI (Resolved)    Symptoms x 2 days. Started with a sore throat. Associated nasal congestion, runny nose, dry cough. Denies any fevers or chills. No N/V/D. No skin rashes.            Past medical, family, and social history reviewed by me in Epic chart today.   Medications and allergies reviewed in Epic chart by me today.       Health Maintenance: Completed    ROS:  Review of Systems   Constitutional:  Negative for chills and fever.   Respiratory:  Negative for shortness of breath.    Cardiovascular:  Negative for chest pain.   Neurological:  Negative for dizziness and headaches.       Objective:     Exam:  BP (!) 160/102 (BP Location: Left arm, Patient Position: Sitting, BP Cuff Size: Adult)   Pulse 84   Temp 36.6 °C (97.9 °F) (Temporal)   Ht 1.829 m (6')   Wt 107 kg (235 lb)   SpO2 97%   BMI 31.87 kg/m²  Body mass  index is 31.87 kg/m².    Physical Exam  Constitutional:       General: He is not in acute distress.  HENT:      Mouth/Throat:      Mouth: Mucous membranes are moist.   Eyes:      Extraocular Movements: Extraocular movements intact.   Cardiovascular:      Rate and Rhythm: Normal rate and regular rhythm.      Heart sounds: No murmur heard.     No friction rub. No gallop.   Pulmonary:      Effort: Pulmonary effort is normal.      Breath sounds: No wheezing, rhonchi or rales.   Musculoskeletal:      Cervical back: Neck supple.   Skin:     General: Skin is warm and dry.   Neurological:      Mental Status: He is alert.   Psychiatric:         Mood and Affect: Mood normal.         Labs:     Results for orders placed or performed during the hospital encounter of 06/25/24   HEMOGLOBIN A1C   Result Value Ref Range    Glycohemoglobin 13.8 (H) 4.0 - 5.6 %    Est Avg Glucose 349 mg/dL   Lipid Profile   Result Value Ref Range    Cholesterol,Tot 322 (H) 100 - 199 mg/dL    Triglycerides 1077 (H) 0 - 149 mg/dL    HDL 33 (A) >=40 mg/dL    LDL see below <100 mg/dL   Comp Metabolic Panel   Result Value Ref Range    Sodium 135 135 - 145 mmol/L    Potassium 3.8 3.6 - 5.5 mmol/L    Chloride 98 96 - 112 mmol/L    Co2 18 (L) 20 - 33 mmol/L    Anion Gap 19.0 (H) 7.0 - 16.0    Glucose 272 (H) 65 - 99 mg/dL    Bun 14 8 - 22 mg/dL    Creatinine 0.62 0.50 - 1.40 mg/dL    Calcium 9.2 8.5 - 10.5 mg/dL    Correct Calcium 8.9 8.5 - 10.5 mg/dL    AST(SGOT) 26 12 - 45 U/L    ALT(SGPT) 41 2 - 50 U/L    Alkaline Phosphatase 82 30 - 99 U/L    Total Bilirubin 0.5 0.1 - 1.5 mg/dL    Albumin 4.4 3.2 - 4.9 g/dL    Total Protein 8.0 6.0 - 8.2 g/dL    Globulin 3.6 (H) 1.9 - 3.5 g/dL    A-G Ratio 1.2 g/dL   FASTING STATUS   Result Value Ref Range    Fasting Status Fasting    ESTIMATED GFR   Result Value Ref Range    GFR (CKD-EPI) 131 >60 mL/min/1.73 m 2         Assessment & Plan:     29 y.o. male with the following -     1. Essential hypertension  Chronic,  uncontrolled.  Restart losartan 50 mg daily.  I have emphasized to the patient the importance of medication compliance.  - losartan (COZAAR) 50 MG Tab; Take 1 Tablet by mouth every day.  Dispense: 90 Tablet; Refill: 0    2. Type 2 diabetes mellitus without complication, without long-term current use of insulin (HCC)  This is a new diagnosis.  Has a history of prediabetes but has not had labs since 2019.  A1c is 13.8%.  At this time, we will start metformin, initially 500 mg twice daily with plan to increase the dose as tolerated. Potential side effects were reviewed. We will also start long-acting insulin 10 units nightly.  Our plan will be to eventually optimize oral therapies and discontinue insulin.  I have provided a prescription for a glucose testing kit and advised patient to keep a fasting blood sugar log.  Most likely this is type 2 diabetes, will check C-peptide level and type 1 diabetes antibodies to rule out DM1.  We had a long discussion regarding nutrition and I have placed a referral to diabetes education.   - GAD65 AND INSULIN AB W/RFLX TO IA-2 AB; Future  - C-PEPTIDE; Future  - metFORMIN (GLUCOPHAGE) 500 MG Tab; Take 1 Tablet by mouth 2 times a day with meals.  Dispense: 180 Tablet; Refill: 0  - insulin glargine 100 UNIT/ML SC SOPN injection; Inject 10 units under the skin every evening.  Dispense: 9 mL; Refill: 0  - Insulin Pen Needle 32 G x 4 mm; Use one pen needle in pen device to inject insulin once daily.  Dispense: 100 Each; Refill: 0  - Alcohol Swabs (ALCOHOL PREP PAD) 70 % Pads; Use to clean skin prior to insulin administration.  Dispense: 100 Each; Refill: 0  - Comp Metabolic Panel; Future  - Blood Glucose Test Strips; Test strips order: Test strips for One Touch Ultra 2 meter. Sig: use every morning and prn ssx high or low sugar  Dispense: 100 Strip; Refill: 2  - Lancets; Lancets order: Lancets for One Touch Ultra 2 meter. Sig: use every morning and prn ssx high or low sugar.  Dispense: 100  Each; Refill: 2  - Blood Glucose Monitoring Suppl (ONE TOUCH ULTRA 2) w/Device Kit; Use every morning and prn ssx high or low sugar.  Dispense: 1 Kit; Refill: 0  - Referral to Diabetic Education    3. Mixed hyperlipidemia  4. Hypertriglyceridemia  Chronic in nature.  Patient has severe hypertriglyceridemia on his most recent labs.  It does not appear that he has had significantly elevated triglycerides in the past, however has not had labs since 2019.  He is not currently treated for dyslipidemia.  We will start Lipitor and Tricor at this time.  Will check monitoring labs after about a month on the medications.  Discussed potential side effects with the patient.  - CREATINE KINASE; Future  - atorvastatin (LIPITOR) 20 MG Tab; Take 1 Tablet by mouth every evening.  Dispense: 90 Tablet; Refill: 0  - fenofibrate (TRICOR) 145 MG Tab; Take 1 Tablet by mouth every day.  Dispense: 90 Tablet; Refill: 0  - Comp Metabolic Panel; Future    5. Screening for HIV (human immunodeficiency virus)  - HIV AG/AB COMBO ASSAY SCREENING; Future    6. Need for hepatitis C screening test  - HEP C VIRUS ANTIBODY; Future    I spent a total of 45 minutes with record review, exam, communication with the patient, communication with other providers, and documentation of this encounter.     Billing  secondary to the complexity of this patient's illnesses and their interactions.  All problems listed were discussed during the office visit, medications were evaluated and complexities were discussed as well as plan for the future.         Return in about 4 weeks (around 7/24/2024) for medications, results.    Please note that this dictation was created using voice recognition software. I have made every reasonable attempt to correct obvious errors, but I expect that there are errors of grammar and possibly content that I did not discover before finalizing the note.

## 2024-06-26 NOTE — PATIENT INSTRUCTIONS
Please start taking your blood pressure medication again daily.  Losartan 50 mg once daily.    For your cholesterol, I have prescribed 2 medications.  Atorvastatin and fenofibrate.  Please start taking both of these medications once daily.  Common side effects include muscle pain.  Please let me know if you are experiencing this side effect.    For diabetes, I have prescribed a medication called metformin, which is taken twice daily with meals.  Common side effects include diarrhea and GI symptoms.  Please let me know if you are experiencing this side effect.  I have also prescribed insulin, which is given once per day in the evening.  I am going to provide a prescription for a blood glucose monitor.  I recommend checking your blood sugar every morning before you have eaten and keeping a log of your blood sugars.  You can also check your blood sugar if you are feeling unwell.    Please get repeat labs done in 4 weeks and we will follow-up at that time as well.

## 2024-07-31 ENCOUNTER — HOSPITAL ENCOUNTER (OUTPATIENT)
Dept: LAB | Facility: MEDICAL CENTER | Age: 29
End: 2024-07-31
Attending: STUDENT IN AN ORGANIZED HEALTH CARE EDUCATION/TRAINING PROGRAM
Payer: COMMERCIAL

## 2024-07-31 DIAGNOSIS — E78.1 HYPERTRIGLYCERIDEMIA: ICD-10-CM

## 2024-07-31 DIAGNOSIS — Z11.59 NEED FOR HEPATITIS C SCREENING TEST: ICD-10-CM

## 2024-07-31 DIAGNOSIS — Z11.4 SCREENING FOR HIV (HUMAN IMMUNODEFICIENCY VIRUS): ICD-10-CM

## 2024-07-31 DIAGNOSIS — E11.9 TYPE 2 DIABETES MELLITUS WITHOUT COMPLICATION, WITHOUT LONG-TERM CURRENT USE OF INSULIN (HCC): ICD-10-CM

## 2024-07-31 PROCEDURE — 36415 COLL VENOUS BLD VENIPUNCTURE: CPT

## 2024-07-31 PROCEDURE — 87389 HIV-1 AG W/HIV-1&-2 AB AG IA: CPT

## 2024-07-31 PROCEDURE — 80053 COMPREHEN METABOLIC PANEL: CPT

## 2024-07-31 PROCEDURE — 86803 HEPATITIS C AB TEST: CPT

## 2024-07-31 PROCEDURE — 82550 ASSAY OF CK (CPK): CPT

## 2024-07-31 PROCEDURE — 86337 INSULIN ANTIBODIES: CPT

## 2024-07-31 PROCEDURE — 86341 ISLET CELL ANTIBODY: CPT

## 2024-07-31 PROCEDURE — 84681 ASSAY OF C-PEPTIDE: CPT

## 2024-08-01 LAB
ALBUMIN SERPL BCP-MCNC: 4.2 G/DL (ref 3.2–4.9)
ALBUMIN/GLOB SERPL: 1.4 G/DL
ALP SERPL-CCNC: 71 U/L (ref 30–99)
ALT SERPL-CCNC: 27 U/L (ref 2–50)
ANION GAP SERPL CALC-SCNC: 13 MMOL/L (ref 7–16)
AST SERPL-CCNC: 14 U/L (ref 12–45)
BILIRUB SERPL-MCNC: 0.3 MG/DL (ref 0.1–1.5)
BUN SERPL-MCNC: 19 MG/DL (ref 8–22)
CALCIUM ALBUM COR SERPL-MCNC: 8.7 MG/DL (ref 8.5–10.5)
CALCIUM SERPL-MCNC: 8.9 MG/DL (ref 8.5–10.5)
CHLORIDE SERPL-SCNC: 100 MMOL/L (ref 96–112)
CK SERPL-CCNC: 120 U/L (ref 0–154)
CO2 SERPL-SCNC: 21 MMOL/L (ref 20–33)
CREAT SERPL-MCNC: 0.94 MG/DL (ref 0.5–1.4)
GFR SERPLBLD CREATININE-BSD FMLA CKD-EPI: 111 ML/MIN/1.73 M 2
GLOBULIN SER CALC-MCNC: 3 G/DL (ref 1.9–3.5)
GLUCOSE SERPL-MCNC: 260 MG/DL (ref 65–99)
HCV AB SER QL: NORMAL
HIV 1+2 AB+HIV1 P24 AG SERPL QL IA: NORMAL
POTASSIUM SERPL-SCNC: 4.1 MMOL/L (ref 3.6–5.5)
PROT SERPL-MCNC: 7.2 G/DL (ref 6–8.2)
SODIUM SERPL-SCNC: 134 MMOL/L (ref 135–145)

## 2024-08-02 LAB — C PEPTIDE SERPL-MCNC: 5.9 NG/ML (ref 0.5–3.3)

## 2024-08-03 LAB — GAD65 AB SER IA-ACNC: <5 IU/ML (ref 0–5)

## 2024-08-04 LAB — INSULIN HUMAN AB SER-ACNC: <0.4 U/ML (ref 0–0.4)

## 2024-08-06 ENCOUNTER — APPOINTMENT (OUTPATIENT)
Dept: MEDICAL GROUP | Facility: PHYSICIAN GROUP | Age: 29
End: 2024-08-06
Payer: COMMERCIAL

## 2024-08-09 ENCOUNTER — OFFICE VISIT (OUTPATIENT)
Dept: MEDICAL GROUP | Facility: PHYSICIAN GROUP | Age: 29
End: 2024-08-09
Payer: COMMERCIAL

## 2024-08-09 VITALS
SYSTOLIC BLOOD PRESSURE: 132 MMHG | BODY MASS INDEX: 33.59 KG/M2 | TEMPERATURE: 97.5 F | OXYGEN SATURATION: 96 % | HEART RATE: 77 BPM | DIASTOLIC BLOOD PRESSURE: 80 MMHG | WEIGHT: 248 LBS | HEIGHT: 72 IN

## 2024-08-09 DIAGNOSIS — E78.2 MIXED HYPERLIPIDEMIA: ICD-10-CM

## 2024-08-09 DIAGNOSIS — I10 ESSENTIAL HYPERTENSION: ICD-10-CM

## 2024-08-09 DIAGNOSIS — E11.9 TYPE 2 DIABETES MELLITUS WITHOUT COMPLICATION, WITHOUT LONG-TERM CURRENT USE OF INSULIN (HCC): ICD-10-CM

## 2024-08-09 PROCEDURE — 3075F SYST BP GE 130 - 139MM HG: CPT | Performed by: STUDENT IN AN ORGANIZED HEALTH CARE EDUCATION/TRAINING PROGRAM

## 2024-08-09 PROCEDURE — 99214 OFFICE O/P EST MOD 30 MIN: CPT | Performed by: STUDENT IN AN ORGANIZED HEALTH CARE EDUCATION/TRAINING PROGRAM

## 2024-08-09 PROCEDURE — 3079F DIAST BP 80-89 MM HG: CPT | Performed by: STUDENT IN AN ORGANIZED HEALTH CARE EDUCATION/TRAINING PROGRAM

## 2024-08-09 ASSESSMENT — ENCOUNTER SYMPTOMS
DIZZINESS: 0
CHILLS: 0
SHORTNESS OF BREATH: 0
FEVER: 0
HEADACHES: 0

## 2024-08-09 ASSESSMENT — FIBROSIS 4 INDEX: FIB4 SCORE: 0.29

## 2024-08-09 NOTE — PROGRESS NOTES
Verbal consent was acquired by the patient to use [employer] ambient listening note generation during this visit.    Subjective:     HPI:   History of Present Illness  The patient presents for evaluation of multiple medical concerns.    Hypertension.  He has restarted losartan 50 mg daily. Denies any side effects. BP is 132/80 today. Does not check BP at home.    Type 2 Diabetes.  Diagnosed at last visit. A1c was 13.8%.  Now on metformin 500 mg twice daily and Lantus 10 units daily.  Had some mild diarrhea on starting the medications but states this has not continued to be a problem.  Morning fasting blood sugars have been in the low 200s per patient.    Hyperlipidemia.  Diagnosed at last visit.  Was started on atorvastatin 20 mg daily as well as Tricor 145 mg daily as his triglycerides were quite elevated at 1077 he is tolerating the medications well with minimal side effects.      Supplemental Information  The patient has been trying to exercise more.  He has lost about 7 pounds since his last visit.      Past medical, surgical, family, and social history were reviewed and updated.     Medications:    Current Outpatient Medications:     metFORMIN (GLUCOPHAGE) 500 MG Tab, Take 2 Tablets by mouth 2 times a day with meals., Disp: 360 Tablet, Rfl: 1    losartan (COZAAR) 50 MG Tab, Take 1 Tablet by mouth every day., Disp: 90 Tablet, Rfl: 0    insulin glargine 100 UNIT/ML SC SOPN injection, Inject 10 units under the skin every evening. (Patient taking differently: Inject 14 Units under the skin every evening. Inject 10 units under the skin every evening.), Disp: 9 mL, Rfl: 0    atorvastatin (LIPITOR) 20 MG Tab, Take 1 Tablet by mouth every evening., Disp: 90 Tablet, Rfl: 0    fenofibrate (TRICOR) 145 MG Tab, Take 1 Tablet by mouth every day., Disp: 90 Tablet, Rfl: 0    Insulin Pen Needle 32 G x 4 mm, Use one pen needle in pen device to inject insulin once daily., Disp: 100 Each, Rfl: 0    Alcohol Swabs (ALCOHOL PREP  PAD) 70 % Pads, Use to clean skin prior to insulin administration., Disp: 100 Each, Rfl: 0    Blood Glucose Test Strips, Test strips order: Test strips for One Touch Ultra 2 meter. Sig: use every morning and prn ssx high or low sugar, Disp: 100 Strip, Rfl: 2    Lancets, Lancets order: Lancets for One Touch Ultra 2 meter. Sig: use every morning and prn ssx high or low sugar., Disp: 100 Each, Rfl: 2    Blood Glucose Monitoring Suppl (ONE TOUCH ULTRA 2) w/Device Kit, Use every morning and prn ssx high or low sugar., Disp: 1 Kit, Rfl: 0    Allergies:  Patient has no known allergies.      Health Maintenance: Completed    ROS:  Review of Systems   Constitutional:  Negative for chills and fever.   Respiratory:  Negative for shortness of breath.    Cardiovascular:  Negative for chest pain.   Neurological:  Negative for dizziness and headaches.       Objective:     Exam:  /80 (BP Location: Left arm, Patient Position: Sitting, BP Cuff Size: Adult)   Pulse 77   Temp 36.4 °C (97.5 °F) (Temporal)   Ht 1.829 m (6')   Wt 112 kg (248 lb)   SpO2 96%   BMI 33.63 kg/m²  Body mass index is 33.63 kg/m².    Physical Exam  Constitutional:       General: He is not in acute distress.  HENT:      Mouth/Throat:      Mouth: Mucous membranes are moist.   Eyes:      Extraocular Movements: Extraocular movements intact.   Pulmonary:      Effort: Pulmonary effort is normal. No respiratory distress.   Musculoskeletal:      Cervical back: Neck supple.   Skin:     Comments: No visible rashes   Neurological:      Mental Status: He is alert.   Psychiatric:         Mood and Affect: Mood normal.         Results  Laboratory Studies  Results for orders placed or performed during the hospital encounter of 07/31/24   BRITTANY-65   Result Value Ref Range    BRITTANY Antibody <5.0 0.0 - 5.0 IU/mL   INSULIN ANTIBODIES   Result Value Ref Range    Insulin Antibody <0.4 0.0 - 0.4 U/mL   C-PEPTIDE   Result Value Ref Range    C-Peptide 5.9 (H) 0.5 - 3.3 ng/mL    CREATINE KINASE   Result Value Ref Range    CPK Total 120 0 - 154 U/L   HIV AG/AB COMBO ASSAY SCREENING   Result Value Ref Range    HIV Ag/Ab Combo Assay Non-Reactive Non Reactive   HEP C VIRUS ANTIBODY   Result Value Ref Range    Hepatitis C Antibody Non-Reactive Non-Reactive   Comp Metabolic Panel   Result Value Ref Range    Sodium 134 (L) 135 - 145 mmol/L    Potassium 4.1 3.6 - 5.5 mmol/L    Chloride 100 96 - 112 mmol/L    Co2 21 20 - 33 mmol/L    Anion Gap 13.0 7.0 - 16.0    Glucose 260 (H) 65 - 99 mg/dL    Bun 19 8 - 22 mg/dL    Creatinine 0.94 0.50 - 1.40 mg/dL    Calcium 8.9 8.5 - 10.5 mg/dL    Correct Calcium 8.7 8.5 - 10.5 mg/dL    AST(SGOT) 14 12 - 45 U/L    ALT(SGPT) 27 2 - 50 U/L    Alkaline Phosphatase 71 30 - 99 U/L    Total Bilirubin 0.3 0.1 - 1.5 mg/dL    Albumin 4.2 3.2 - 4.9 g/dL    Total Protein 7.2 6.0 - 8.2 g/dL    Globulin 3.0 1.9 - 3.5 g/dL    A-G Ratio 1.4 g/dL   ESTIMATED GFR   Result Value Ref Range    GFR (CKD-EPI) 111 >60 mL/min/1.73 m 2         Assessment & Plan:     1. Essential hypertension        2. Type 2 diabetes mellitus without complication, without long-term current use of insulin (HCC)  metFORMIN (GLUCOPHAGE) 500 MG Tab    HEMOGLOBIN A1C    Basic Metabolic Panel    MICROALBUMIN CREAT RATIO URINE      3. Mixed hyperlipidemia  Lipid Profile          Assessment & Plan  1. Hypertension.  Chronic, improved.  Blood pressure stable.  Continuation of losartan 50 mg daily is advised.    2. Type 2 diabetes.  Chronic, uncontrolled.  It is too soon to recheck his A1c.  We will repeat labs in about 6 weeks.  He is tolerating metformin well, we will go up to 1000 mg twice daily.  As he is still finding that his fasting blood sugars are around 200, we will increase his dose of long-acting insulin to 14 units nightly.  He is advised to continue a blood sugar log.     3. Hypertriglyceridemia.  Continuation of atorvastatin and fenofibrate is advised.  Will repeat labs in 1 to 2  months.      Follow-up  Follow-up is scheduled for 2 months.            Please note that this dictation was created using voice recognition software. I have made every reasonable attempt to correct obvious errors, but I expect that there are errors of grammar and possibly content that I did not discover before finalizing the note.

## 2024-09-23 DIAGNOSIS — E11.9 TYPE 2 DIABETES MELLITUS WITHOUT COMPLICATION, WITHOUT LONG-TERM CURRENT USE OF INSULIN (HCC): ICD-10-CM

## 2024-09-26 ENCOUNTER — TELEPHONE (OUTPATIENT)
Dept: HEALTH INFORMATION MANAGEMENT | Facility: OTHER | Age: 29
End: 2024-09-26
Payer: COMMERCIAL

## 2024-10-11 ENCOUNTER — APPOINTMENT (OUTPATIENT)
Dept: MEDICAL GROUP | Facility: PHYSICIAN GROUP | Age: 29
End: 2024-10-11
Payer: COMMERCIAL

## 2024-11-22 ENCOUNTER — APPOINTMENT (OUTPATIENT)
Dept: MEDICAL GROUP | Facility: PHYSICIAN GROUP | Age: 29
End: 2024-11-22
Payer: COMMERCIAL

## 2025-03-28 ENCOUNTER — APPOINTMENT (OUTPATIENT)
Dept: MEDICAL GROUP | Facility: PHYSICIAN GROUP | Age: 30
End: 2025-03-28

## 2025-03-28 DIAGNOSIS — E11.9 TYPE 2 DIABETES MELLITUS WITHOUT COMPLICATION, WITHOUT LONG-TERM CURRENT USE OF INSULIN (HCC): ICD-10-CM

## 2025-05-30 ENCOUNTER — APPOINTMENT (OUTPATIENT)
Dept: MEDICAL GROUP | Facility: PHYSICIAN GROUP | Age: 30
End: 2025-05-30

## 2025-05-30 DIAGNOSIS — E11.9 TYPE 2 DIABETES MELLITUS WITHOUT COMPLICATION, WITHOUT LONG-TERM CURRENT USE OF INSULIN (HCC): ICD-10-CM

## 2025-06-06 ENCOUNTER — APPOINTMENT (OUTPATIENT)
Dept: MEDICAL GROUP | Facility: PHYSICIAN GROUP | Age: 30
End: 2025-06-06

## 2025-06-06 ENCOUNTER — OFFICE VISIT (OUTPATIENT)
Dept: MEDICAL GROUP | Facility: PHYSICIAN GROUP | Age: 30
End: 2025-06-06
Payer: COMMERCIAL

## 2025-06-06 ENCOUNTER — HOSPITAL ENCOUNTER (OUTPATIENT)
Facility: MEDICAL CENTER | Age: 30
End: 2025-06-06
Attending: FAMILY MEDICINE
Payer: COMMERCIAL

## 2025-06-06 ENCOUNTER — RESULTS FOLLOW-UP (OUTPATIENT)
Dept: MEDICAL GROUP | Facility: PHYSICIAN GROUP | Age: 30
End: 2025-06-06

## 2025-06-06 VITALS
HEART RATE: 77 BPM | TEMPERATURE: 98.3 F | DIASTOLIC BLOOD PRESSURE: 80 MMHG | OXYGEN SATURATION: 98 % | HEIGHT: 72 IN | WEIGHT: 229.8 LBS | SYSTOLIC BLOOD PRESSURE: 142 MMHG | BODY MASS INDEX: 31.13 KG/M2

## 2025-06-06 DIAGNOSIS — E11.65 TYPE 2 DIABETES MELLITUS WITH HYPERGLYCEMIA, WITHOUT LONG-TERM CURRENT USE OF INSULIN (HCC): ICD-10-CM

## 2025-06-06 DIAGNOSIS — I10 ESSENTIAL HYPERTENSION: ICD-10-CM

## 2025-06-06 DIAGNOSIS — E11.9 TYPE 2 DIABETES MELLITUS WITHOUT COMPLICATION, WITHOUT LONG-TERM CURRENT USE OF INSULIN (HCC): ICD-10-CM

## 2025-06-06 DIAGNOSIS — Z23 NEED FOR VACCINATION: ICD-10-CM

## 2025-06-06 DIAGNOSIS — E11.65 TYPE 2 DIABETES MELLITUS WITH HYPERGLYCEMIA, WITHOUT LONG-TERM CURRENT USE OF INSULIN (HCC): Primary | ICD-10-CM

## 2025-06-06 DIAGNOSIS — E78.2 MIXED HYPERLIPIDEMIA: ICD-10-CM

## 2025-06-06 LAB
CREAT UR-MCNC: 111 MG/DL
HBA1C MFR BLD: 13.1 % (ref ?–5.8)
MICROALBUMIN UR-MCNC: 20.5 MG/DL
MICROALBUMIN/CREAT UR: 185 MG/G (ref 0–30)
POCT INT CON NEG: NEGATIVE
POCT INT CON POS: POSITIVE

## 2025-06-06 PROCEDURE — 90471 IMMUNIZATION ADMIN: CPT | Performed by: FAMILY MEDICINE

## 2025-06-06 PROCEDURE — 3079F DIAST BP 80-89 MM HG: CPT | Performed by: FAMILY MEDICINE

## 2025-06-06 PROCEDURE — 82043 UR ALBUMIN QUANTITATIVE: CPT

## 2025-06-06 PROCEDURE — 90677 PCV20 VACCINE IM: CPT | Performed by: FAMILY MEDICINE

## 2025-06-06 PROCEDURE — 99214 OFFICE O/P EST MOD 30 MIN: CPT | Mod: 25 | Performed by: FAMILY MEDICINE

## 2025-06-06 PROCEDURE — 82570 ASSAY OF URINE CREATININE: CPT

## 2025-06-06 PROCEDURE — 3077F SYST BP >= 140 MM HG: CPT | Performed by: FAMILY MEDICINE

## 2025-06-06 PROCEDURE — 83036 HEMOGLOBIN GLYCOSYLATED A1C: CPT | Performed by: FAMILY MEDICINE

## 2025-06-06 PROCEDURE — 92250 FUNDUS PHOTOGRAPHY W/I&R: CPT | Mod: 26 | Performed by: FAMILY MEDICINE

## 2025-06-06 RX ORDER — FENOFIBRATE 145 MG/1
145 TABLET, FILM COATED ORAL DAILY
Qty: 90 TABLET | Refills: 0 | Status: SHIPPED | OUTPATIENT
Start: 2025-06-06

## 2025-06-06 RX ORDER — LOSARTAN POTASSIUM 50 MG/1
50 TABLET ORAL DAILY
Qty: 90 TABLET | Refills: 0 | Status: SHIPPED | OUTPATIENT
Start: 2025-06-06

## 2025-06-06 RX ORDER — ATORVASTATIN CALCIUM 20 MG/1
20 TABLET, FILM COATED ORAL NIGHTLY
Qty: 90 TABLET | Refills: 0 | Status: SHIPPED | OUTPATIENT
Start: 2025-06-06

## 2025-06-06 RX ORDER — AVOBENZONE, HOMOSALATE, OCTISALATE, OCTOCRYLENE 30; 40; 45; 26 MG/ML; MG/ML; MG/ML; MG/ML
CREAM TOPICAL
Qty: 100 EACH | Refills: 0 | Status: SHIPPED | OUTPATIENT
Start: 2025-06-06

## 2025-06-06 RX ORDER — ISOPROPYL ALCOHOL 0.7 ML/1
SWAB TOPICAL
Qty: 100 EACH | Refills: 0 | Status: SHIPPED | OUTPATIENT
Start: 2025-06-06

## 2025-06-06 ASSESSMENT — PATIENT HEALTH QUESTIONNAIRE - PHQ9: CLINICAL INTERPRETATION OF PHQ2 SCORE: 0

## 2025-06-06 NOTE — PROGRESS NOTES
Verbal consent was acquired by the patient to use [employer] ambient listening note generation during this visit     Subjective:     HPI:   History of Present Illness  The patient presents for evaluation of type 2 diabetes with hyperglycemia, mixed hyperlipidemia, and hypertension.    Diabetes Management  - The primary reason for this visit is to address his diabetes management.  - He has not had a diabetes check since June of last year and has not been monitoring his blood glucose levels at home until last week.  - He has been without metformin and insulin for several months  - Previously, he was on a regimen of 14 units of insulin.  - He reports persistent thirst and frequent urination.  - He is currently making dietary modifications to manage his condition.  - He still has the glucose monitor but needs more supplies    Hyperlipidemia  - He has not been on atorvastatin or fenofibrate for several months  - He does not recall any adverse effects from these medications.    Hypertension  - He has not been adhering to his antihypertensive therapy recently.  - He recalls developing a cough while on losartan.    Supplemental information: He reports no recent changes in his insurance status. He experiences dyspnea upon exertion but reports no other respiratory difficulties. He recently returned from a 3-week trip to the [facility], during which he experienced a fever. He has been feeling excessively sleepy lately.    Health Maintenance: Completed    Objective:     Exam:  BP (!) 142/80 (BP Location: Left arm, Patient Position: Sitting, BP Cuff Size: Adult)   Pulse 77   Temp 36.8 °C (98.3 °F) (Temporal)   Ht 1.829 m (6')   Wt 104 kg (229 lb 12.8 oz)   SpO2 98%   BMI 31.17 kg/m²  Body mass index is 31.17 kg/m².    Physical Exam  Constitutional:       Appearance: Normal appearance.   Cardiovascular:      Rate and Rhythm: Normal rate and regular rhythm.      Heart sounds: Normal heart sounds.   Pulmonary:      Effort:  Pulmonary effort is normal.      Breath sounds: Normal breath sounds.   Musculoskeletal:      Cervical back: Normal range of motion and neck supple.   Lymphadenopathy:      Cervical: No cervical adenopathy.   Neurological:      Mental Status: He is alert.             Results  Labs   - A1c: 06/06/2025, 13.1%    Assessment & Plan:     1. Type 2 diabetes mellitus with hyperglycemia, without long-term current use of insulin (HCC)  POCT A1C    POCT Retinal Eye Exam    Microalbumin Creat Ratio Urine (Clinic Collect)    insulin glargine 100 UNIT/ML SC SOPN injection    metFORMIN (GLUCOPHAGE) 500 MG Tab    Lancets    Blood Glucose Test Strips    Alcohol Swabs (ALCOHOL PREP PAD) 70 % Pads      2. Mixed hyperlipidemia  atorvastatin (LIPITOR) 20 MG Tab    fenofibrate (TRICOR) 145 MG Tab      3. Essential hypertension  losartan (COZAAR) 50 MG Tab      4. Need for vaccination  Pneumococcal Conjugate Vaccine 20-Valent (6 wks+)          Assessment & Plan  1. Type 2 diabetes with hyperglycemia: Chronic and uncontrolled, with an A1c of 13.1% today.  - Diagnostic plan: A urine sample was obtained today for a microalbumin to creatinine ratio, and a point-of-care retina exam was performed.  - Treatment plan: Insulin glargine will be restarted at 14 units every evening. Metformin will be restarted at 500 mg twice a day for the first 2 weeks, then increased to 1000 mg twice a day after that. He will start checking his fasting sugars every morning, and communication will be maintained through [facility] to adjust his insulin dose based on his fasting sugar levels.    2. Mixed hyperlipidemia: Chronic and uncontrolled, with elevated cholesterol levels noted in June of last year.  - Treatment plan: Atorvastatin 20 mg nightly and fenofibrate 145 mg daily will be restarted. Lab slips have been printed, and he will complete labs about 2 weeks before his next visit to assess the effectiveness of these medications.    3. Hypertension: Chronic and  uncontrolled, with elevated blood pressure noted in the office today.  - Treatment plan: Losartan 50 mg daily will be restarted. His blood pressure will be reevaluated at his next office visit.    4. Health maintenance:   - PCV20 given today  - Follow-up in 3 months with Noemi to reassess diabetes management and medication effectiveness.    Return in about 3 months (around 9/6/2025) for f/u DM, get A1c.    Please note that this dictation was created using voice recognition software. I have made every reasonable attempt to correct obvious errors, but I expect that there are errors of grammar and possibly content that I did not discover before finalizing the note.

## 2025-06-09 ENCOUNTER — PATIENT MESSAGE (OUTPATIENT)
Dept: MEDICAL GROUP | Facility: PHYSICIAN GROUP | Age: 30
End: 2025-06-09
Payer: COMMERCIAL

## 2025-06-11 ENCOUNTER — TELEPHONE (OUTPATIENT)
Dept: MEDICAL GROUP | Facility: PHYSICIAN GROUP | Age: 30
End: 2025-06-11
Payer: COMMERCIAL

## 2025-06-11 NOTE — TELEPHONE ENCOUNTER
Phone Number Called: [phone]    Call outcome: Spoke to patient regarding message below.    Message: Spoke with patient to let him know to increase his insulin to 17 units and to keep track of his number and we will call on Monday 6/16/2025 for his numbers.

## 2025-06-11 NOTE — PROGRESS NOTES
11:30 AM: Please call him back    6/11: 267    Tell him that he is going to increase his insulin to 17 units every day. He needs to keep checking every morning and we will call him on Monday to see what his last 3 sugars were to adjust the insulin further. Thank you.      8:30 AM: Please call him back.    I need the last 3 days of fasting sugars, including this morning to figure out how to adjust the insulin dose. What did he get this morning? Thanks.    6/9: 293  6/10: 277

## 2025-06-17 LAB — RETINAL SCREEN: NEGATIVE
